# Patient Record
Sex: FEMALE | Race: WHITE | Employment: OTHER | URBAN - METROPOLITAN AREA
[De-identification: names, ages, dates, MRNs, and addresses within clinical notes are randomized per-mention and may not be internally consistent; named-entity substitution may affect disease eponyms.]

---

## 2017-01-06 ENCOUNTER — ALLIED HEALTH/NURSE VISIT (OUTPATIENT)
Dept: FAMILY MEDICINE | Facility: CLINIC | Age: 62
End: 2017-01-06
Payer: COMMERCIAL

## 2017-01-06 DIAGNOSIS — E53.8 VITAMIN B12 DEFICIENCY (NON ANEMIC): Primary | ICD-10-CM

## 2017-01-06 PROCEDURE — 96372 THER/PROPH/DIAG INJ SC/IM: CPT

## 2017-01-06 PROCEDURE — 99207 ZZC NO CHARGE NURSE ONLY: CPT

## 2017-01-06 NOTE — PROGRESS NOTES
The following medication was given:     MEDICATION: Vitamin B12  1000mcg  ROUTE: IM  SITE: Deltoid - Left  DOSE: 1mL  LOT #: [id number]  :  [employer]  EXPIRATION DATE:  05/18  NDC#: 1012-2556-80  RACHEL Richter

## 2017-02-17 ENCOUNTER — ALLIED HEALTH/NURSE VISIT (OUTPATIENT)
Dept: FAMILY MEDICINE | Facility: CLINIC | Age: 62
End: 2017-02-17
Payer: COMMERCIAL

## 2017-02-17 DIAGNOSIS — J30.1 ALLERGIC RHINITIS DUE TO POLLEN: ICD-10-CM

## 2017-02-17 DIAGNOSIS — E53.8 VITAMIN B12 DEFICIENCY (NON ANEMIC): Primary | ICD-10-CM

## 2017-02-17 DIAGNOSIS — E03.4 HYPOTHYROIDISM DUE TO ACQUIRED ATROPHY OF THYROID: ICD-10-CM

## 2017-02-17 PROCEDURE — 96372 THER/PROPH/DIAG INJ SC/IM: CPT

## 2017-02-17 PROCEDURE — 99207 ZZC NO CHARGE NURSE ONLY: CPT

## 2017-02-17 RX ORDER — LEVOTHYROXINE SODIUM 150 UG/1
150 TABLET ORAL DAILY
Qty: 90 TABLET | Refills: 0 | Status: SHIPPED | OUTPATIENT
Start: 2017-02-17 | End: 2017-05-21

## 2017-02-17 NOTE — TELEPHONE ENCOUNTER
Prescription approved per [facility] [place].    Missy Pearson  Pharm.D.  [facility]  Float Pharmacist  On Behalf of [facility] Gamaliel Jama

## 2017-02-17 NOTE — TELEPHONE ENCOUNTER
Qnasl      Last Written Prescription Date: 5/27/16  Last Fill Quantity: 8.7g,  # refills: 3   Last Office Visit with [facility], [facility] or [facility] prescribing provider: 5/27/16                                         Next 5 appointments (look out 90 days)     Feb 17, 2017  4:30 PM CST   Nurse Only with Fl Ll Cma/Lpn   [facility]    [address] [address]   [phone]            Mar 20, 2017  4:30 PM CDT   Nurse Only with Fl Ll Cma/Lpn   [facility] ([facility])    [address] [address]   [phone]                  Synthroid 150mcg     Last Written Prescription Date: 5/27/16  Last Quantity: 90, # refills: 2  Last Office Visit with [facility], [facility] or [facility] prescribing provider: 5/27/16   Next 5 appointments (look out 90 days)     Feb 17, 2017  4:30 PM CST   Nurse Only with Fl Ll Cma/Lpn   [facility] ([facility])    [address] [address] [phone]            Mar 20, 2017  4:30 PM CDT   Nurse Only with Fl Ll Cma/Lpn   [facility] ([facility])    [address] [address]   [phone]                   TSH   Date Value Ref Range Status   10/20/2015 1.6 mcU/mL Final       Timi Bridges CPhT  [facility]    On behalf of [facility]

## 2017-03-20 ENCOUNTER — ALLIED HEALTH/NURSE VISIT (OUTPATIENT)
Dept: FAMILY MEDICINE | Facility: CLINIC | Age: 62
End: 2017-03-20
Payer: COMMERCIAL

## 2017-03-20 DIAGNOSIS — E53.8 VITAMIN B12 DEFICIENCY (NON ANEMIC): Primary | ICD-10-CM

## 2017-03-20 PROCEDURE — 96372 THER/PROPH/DIAG INJ SC/IM: CPT

## 2017-03-20 PROCEDURE — 99207 ZZC NO CHARGE NURSE ONLY: CPT

## 2017-04-21 ENCOUNTER — ALLIED HEALTH/NURSE VISIT (OUTPATIENT)
Dept: FAMILY MEDICINE | Facility: CLINIC | Age: 62
End: 2017-04-21
Payer: COMMERCIAL

## 2017-04-21 DIAGNOSIS — E53.8 VITAMIN B12 DEFICIENCY (NON ANEMIC): ICD-10-CM

## 2017-04-21 PROCEDURE — 96372 THER/PROPH/DIAG INJ SC/IM: CPT

## 2017-04-21 PROCEDURE — 99207 ZZC NO CHARGE NURSE ONLY: CPT

## 2017-04-21 NOTE — PROGRESS NOTES
The following medication was given:     MEDICATION: Vitamin B12  1,000mcg  ROUTE: IM  SITE: Deltoid - Right  DOSE: mL  LOT #: [id number]  :  [employer]  EXPIRATION DATE:  7/1/2018  NDC#: 8223-1981-61

## 2017-04-21 NOTE — MR AVS SNAPSHOT
After Visit Summary   4/21/2017    Leanna Griffin    MRN: [id number]           Patient Information     Date Of Birth          1955        Visit Information        Provider Department      4/21/2017 4:30 PM Cma/Lpn, Fl Ll [facility]        Today's Diagnoses     Vitamin B12 deficiency (non anemic)           Follow-ups after your visit        Your next 10 appointments already scheduled     Apr 21, 2017  4:30 PM CDT   Nurse Only with Fl Ll Cma/Lpn   [facility] ([facility]    [address] [address] [phone]            May 19, 2017  4:30 PM CDT   Nurse Only with Fl Ll Cma/Lpn   [facility] ([facility])    [address] [address] [phone]              Who to contact     Normal or non-critical lab and imaging results will be communicated to you by Mojeekhart, letter or phone within 4 business days after the clinic has received the results. If you do not hear from us within 7 days, please contact the clinic through Mojeekhart or phone. If you have a critical or abnormal lab result, we will notify you by phone as soon as possible.  Submit refill requests through Vestorly or call your pharmacy and they will forward the refill request to us. Please allow 3 business days for your refill to be completed.          If you need to speak with a  for additional information , please call: [phone]           Additional Information About Your Visit        [employer]jeek[employer] Information     Vestorly gives you secure access to your electronic health record. If you see a primary care provider, you can also send messages to your care team and make appointments. If you have questions, please call your primary care clinic.  If you do not have a primary care provider, please call [phone] and they will assist you.        Care EveryWhere ID     This is your Care EveryWhere ID. This  could be used by other organizations to access your [facility] medical records  [id number]        Your Vitals Were     Last Period                   06/23/2006            Blood Pressure from Last 3 Encounters:   05/27/16 117/73   02/08/16 108/60   09/08/15 149/89    Weight from Last 3 Encounters:   05/27/16 225 lb 9.6 oz (102.3 kg)   09/08/15 220 lb 14.4 oz (100.2 kg)   08/18/15 220 lb 9.6 oz (100.1 kg)              We Performed the Following     THER/PROPH/DIAG INJ, SC/IM     VITAMIN B12 INJ /1000MCG        Primary Care Provider Office Phone # Fax #    Zackery Hope [phone]       [facility] [address]  [facility] [address]        Thank you!     Thank you for choosing [facility]  for your care. Our goal is always to provide you with excellent care. Hearing back from our patients is one way we can continue to improve our services. Please take a few minutes to complete the written survey that you may receive in the mail after your visit with us. Thank you!             Your Updated Medication List - Protect others around you: Learn how to safely use, store and throw away your medicines at www.disposemymeds.org.          This list is accurate as of: 4/21/17  4:24 PM.  Always use your most recent med list.                   Brand Name Dispense Instructions for use    Beclomethasone Dipropionate 80 MCG/ACT Aers Nasal Spray    QNASL    8.7 g    Spray 2 sprays into both nostrils daily       calcium 500 MG Chew      Take 500 mg by mouth daily       cholecalciferol 5000 UNITS Caps capsule    vitamin D3     Take 5,000 Units by mouth daily       cyanocobalamin 1000 MCG/ML injection    VITAMIN B12    1 mL    Inject 1 mL (1,000 mcg) into the muscle every 30 days       folic acid 1 MG tablet    FOLVITE    90 tablet    Take 1 tablet (1 mg) by mouth daily       levothyroxine 150 MCG tablet    SYNTHROID/LEVOTHROID    90 tablet    Take 1 tablet (150 mcg) by mouth daily        montelukast 10 MG tablet    SINGULAIR    180 tablet    Take 1 tablet (10 mg) by mouth 2 times daily       WOMENS MULTI VITAMIN & MINERAL PO      Take by mouth daily

## 2017-05-19 ENCOUNTER — ALLIED HEALTH/NURSE VISIT (OUTPATIENT)
Dept: FAMILY MEDICINE | Facility: CLINIC | Age: 62
End: 2017-05-19
Payer: COMMERCIAL

## 2017-05-19 DIAGNOSIS — E53.8 VITAMIN B12 DEFICIENCY (NON ANEMIC): Primary | ICD-10-CM

## 2017-05-19 PROCEDURE — 99207 ZZC NO CHARGE NURSE ONLY: CPT

## 2017-05-19 PROCEDURE — 96372 THER/PROPH/DIAG INJ SC/IM: CPT

## 2017-05-21 DIAGNOSIS — L50.0 ALLERGIC URTICARIA: ICD-10-CM

## 2017-05-21 DIAGNOSIS — E53.8 FOLATE DEFICIENCY: ICD-10-CM

## 2017-05-21 DIAGNOSIS — E03.4 HYPOTHYROIDISM DUE TO ACQUIRED ATROPHY OF THYROID: ICD-10-CM

## 2017-05-21 DIAGNOSIS — E53.8 VITAMIN B12 DEFICIENCY (NON ANEMIC): ICD-10-CM

## 2017-05-22 NOTE — TELEPHONE ENCOUNTER
Folic Acid  1mg      Last Written Prescription Date: 11/14/2016   #90 x 1  Last filled 02/17/2017  Last Office Visit with [facility], [facility] or  Health prescribing provider: 05/27/2016 ANGELINE Hope                                         Next 5 appointments (look out 90 days)     Jun 09, 2017  4:30 PM CDT   Nurse Only with Fl Ganga Crane/Lpn   [facility]    [address] [address] [phone]

## 2017-05-23 RX ORDER — FOLIC ACID 1 MG/1
1000 TABLET ORAL DAILY
Qty: 30 TABLET | Refills: 0 | Status: SHIPPED | OUTPATIENT
Start: 2017-05-23 | End: 2017-06-12

## 2017-05-23 RX ORDER — MONTELUKAST SODIUM 10 MG/1
1 TABLET ORAL 2 TIMES DAILY
Qty: 60 TABLET | Refills: 0 | Status: SHIPPED | OUTPATIENT
Start: 2017-05-23 | End: 2017-06-12

## 2017-05-23 RX ORDER — LEVOTHYROXINE SODIUM 150 UG/1
150 TABLET ORAL DAILY
Qty: 30 TABLET | Refills: 0 | Status: SHIPPED | OUTPATIENT
Start: 2017-05-23 | End: 2017-06-12

## 2017-06-12 ENCOUNTER — OFFICE VISIT (OUTPATIENT)
Dept: FAMILY MEDICINE | Facility: CLINIC | Age: 62
End: 2017-06-12
Payer: COMMERCIAL

## 2017-06-12 VITALS
SYSTOLIC BLOOD PRESSURE: 137 MMHG | HEART RATE: 80 BPM | DIASTOLIC BLOOD PRESSURE: 84 MMHG | WEIGHT: 251.6 LBS | TEMPERATURE: 97.6 F | HEIGHT: 64 IN | BODY MASS INDEX: 42.95 KG/M2

## 2017-06-12 DIAGNOSIS — M35.00 SJOGREN'S SYNDROME (H): ICD-10-CM

## 2017-06-12 DIAGNOSIS — E53.8 VITAMIN B12 DEFICIENCY (NON ANEMIC): ICD-10-CM

## 2017-06-12 DIAGNOSIS — L50.0 ALLERGIC URTICARIA: ICD-10-CM

## 2017-06-12 DIAGNOSIS — E78.5 HYPERLIPIDEMIA LDL GOAL <130: ICD-10-CM

## 2017-06-12 DIAGNOSIS — G62.9 NEUROPATHY: ICD-10-CM

## 2017-06-12 DIAGNOSIS — E03.4 HYPOTHYROIDISM DUE TO ACQUIRED ATROPHY OF THYROID: ICD-10-CM

## 2017-06-12 DIAGNOSIS — E53.8 FOLATE DEFICIENCY: ICD-10-CM

## 2017-06-12 DIAGNOSIS — J30.1 SEASONAL ALLERGIC RHINITIS DUE TO POLLEN: ICD-10-CM

## 2017-06-12 DIAGNOSIS — E66.09 NON MORBID OBESITY DUE TO EXCESS CALORIES: Primary | ICD-10-CM

## 2017-06-12 DIAGNOSIS — I10 HYPERTENSION GOAL BP (BLOOD PRESSURE) < 140/90: ICD-10-CM

## 2017-06-12 PROCEDURE — 84443 ASSAY THYROID STIM HORMONE: CPT | Performed by: FAMILY MEDICINE

## 2017-06-12 PROCEDURE — 90471 IMMUNIZATION ADMIN: CPT | Performed by: FAMILY MEDICINE

## 2017-06-12 PROCEDURE — 82746 ASSAY OF FOLIC ACID SERUM: CPT | Performed by: FAMILY MEDICINE

## 2017-06-12 PROCEDURE — 90670 PCV13 VACCINE IM: CPT | Performed by: FAMILY MEDICINE

## 2017-06-12 PROCEDURE — 80061 LIPID PANEL: CPT | Performed by: FAMILY MEDICINE

## 2017-06-12 PROCEDURE — 99000 SPECIMEN HANDLING OFFICE-LAB: CPT | Performed by: FAMILY MEDICINE

## 2017-06-12 PROCEDURE — 99214 OFFICE O/P EST MOD 30 MIN: CPT | Mod: 25 | Performed by: FAMILY MEDICINE

## 2017-06-12 PROCEDURE — 82607 VITAMIN B-12: CPT | Performed by: FAMILY MEDICINE

## 2017-06-12 PROCEDURE — 83921 ORGANIC ACID SINGLE QUANT: CPT | Mod: 90 | Performed by: FAMILY MEDICINE

## 2017-06-12 PROCEDURE — 80053 COMPREHEN METABOLIC PANEL: CPT | Performed by: FAMILY MEDICINE

## 2017-06-12 PROCEDURE — 84550 ASSAY OF BLOOD/URIC ACID: CPT | Performed by: FAMILY MEDICINE

## 2017-06-12 PROCEDURE — 36415 COLL VENOUS BLD VENIPUNCTURE: CPT | Performed by: FAMILY MEDICINE

## 2017-06-12 RX ORDER — MONTELUKAST SODIUM 10 MG/1
1 TABLET ORAL 2 TIMES DAILY
Qty: 180 TABLET | Refills: 3 | Status: SHIPPED | OUTPATIENT
Start: 2017-06-12 | End: 2017-12-18

## 2017-06-12 RX ORDER — CYANOCOBALAMIN 1000 UG/ML
1 INJECTION, SOLUTION INTRAMUSCULAR; SUBCUTANEOUS
Qty: 1 ML | Refills: 11 | Status: SHIPPED | OUTPATIENT
Start: 2017-06-12 | End: 2017-12-18

## 2017-06-12 RX ORDER — LEVOTHYROXINE SODIUM 150 UG/1
150 TABLET ORAL DAILY
Qty: 90 TABLET | Refills: 3 | Status: SHIPPED | OUTPATIENT
Start: 2017-06-12 | End: 2017-06-17

## 2017-06-12 RX ORDER — FOLIC ACID 1 MG/1
1000 TABLET ORAL DAILY
Qty: 30 TABLET | Refills: 0 | Status: SHIPPED | OUTPATIENT
Start: 2017-06-12 | End: 2017-07-25

## 2017-06-12 NOTE — NURSING NOTE
"Chief Complaint   Patient presents with     Recheck Medication       Initial /84  Pulse 80  Temp 97.6  F (36.4  C) (Tympanic)  Ht 5' 3.5\" (1.613 m)  Wt 251 lb 9.6 oz (114.1 kg)  LMP 06/23/2006  Breastfeeding? No  BMI 43.87 kg/m2 Estimated body mass index is 43.87 kg/(m^2) as calculated from the following:    Height as of this encounter: 5' 3.5\" (1.613 m).    Weight as of this encounter: 251 lb 9.6 oz (114.1 kg).  Medication Reconciliation: complete     Yvonne Lorenzo CMA      "

## 2017-06-12 NOTE — MR AVS SNAPSHOT
After Visit Summary   6/12/2017    Leanna Griffin    MRN: [id number]           Patient Information     Date Of Birth          1955        Visit Information        Provider Department      6/12/2017 6:30 PM Zackery Hope MD [facility]        Today's Diagnoses     Non morbid obesity due to excess calories    -  1    Folate deficiency        Vitamin B12 deficiency (non anemic)        Allergic urticaria        Hypothyroidism due to acquired atrophy of thyroid        Hypertension goal BP (blood pressure) < 140/90        Hyperlipidemia LDL goal <130        Sjogren's syndrome (H)        Neuropathy (H)        Seasonal allergic rhinitis due to pollen          Care Instructions    Labs today.  Exercise as tollerated.  Maybe a trike?  :-)  Refills done.           Follow-ups after your visit        Follow-up notes from your care team     Return in about 6 months (around 12/12/2017).      Your next 10 appointments already scheduled     Jun 16, 2017  4:30 PM CDT   Nurse Only with Fl Ll Royce/Lpn   [facility] ([facility])    [address] [address] [phone]              Who to contact     Normal or non-critical lab and imaging results will be communicated to you by PopJaxhart, letter or phone within 4 business days after the clinic has received the results. If you do not hear from us within 7 days, please contact the clinic through PopJaxhart or phone. If you have a critical or abnormal lab result, we will notify you by phone as soon as possible.  Submit refill requests through [employer] or call your pharmacy and they will forward the refill request to us. Please allow 3 business days for your refill to be completed.          If you need to speak with a  for additional information , please call: [phone]           Additional Information About Your Visit        [employer]ax[employer] Information     [employer] gives you secure access to your  "electronic health record. If you see a primary care provider, you can also send messages to your care team and make appointments. If you have questions, please call your primary care clinic.  If you do not have a primary care provider, please call [phone] and they will assist you.        Care EveryWhere ID     This is your Care EveryWhere ID. This could be used by other organizations to access your [facility] medical records  [id number]        Your Vitals Were     Pulse Temperature Height Last Period Breastfeeding? BMI (Body Mass Index)    80 97.6  F (36.4  C) (Tympanic) 5' 3.5\" (1.613 m) 06/23/2006 No 43.87 kg/m2       Blood Pressure from Last 3 Encounters:   06/12/17 137/84   05/27/16 117/73   02/08/16 108/60    Weight from Last 3 Encounters:   06/12/17 251 lb 9.6 oz (114.1 kg)   05/27/16 225 lb 9.6 oz (102.3 kg)   09/08/15 220 lb 14.4 oz (100.2 kg)              We Performed the Following     ADMIN: Vaccine, Initial (13773)     Comprehensive metabolic panel     Folate     Lipid panel reflex to direct LDL     Methylmalonic acid     Pneumococcal vaccine 13 valent PCV13 IM (Prevnar) [05691]     TSH     Uric acid     Vitamin B12          Where to get your medicines      These medications were sent to [facility] [address]  [address] [address]     Phone:  [phone]     Beclomethasone Dipropionate 80 MCG/ACT Aers Nasal Spray    cyanocobalamin 1000 MCG/ML injection    folic acid 1 MG tablet    levothyroxine 150 MCG tablet    montelukast 10 MG tablet          Primary Care Provider Office Phone # Fax #    Zackery Hope [phone]       [facility] [address]  [facility] [address]        Thank you!     Thank you for choosing [facility]  for your care. Our goal is always to provide you with excellent care. Hearing back from our patients is one way we can continue to improve our services. Please " take a few minutes to complete the written survey that you may receive in the mail after your visit with us. Thank you!             Your Updated Medication List - Protect others around you: Learn how to safely use, store and throw away your medicines at www.disposemymeds.org.          This list is accurate as of: 6/12/17  7:08 PM.  Always use your most recent med list.                   Brand Name Dispense Instructions for use    Beclomethasone Dipropionate 80 MCG/ACT Aers Nasal Spray    QNASL    8.7 g    Spray 2 sprays into both nostrils daily       calcium 500 MG Chew      Take 500 mg by mouth daily       cholecalciferol 5000 UNITS Caps capsule    vitamin D3     Take 5,000 Units by mouth daily       cyanocobalamin 1000 MCG/ML injection    VITAMIN B12    1 mL    Inject 1 mL (1,000 mcg) into the muscle every 30 days       folic acid 1 MG tablet    FOLVITE    30 tablet    Take 1 tablet (1,000 mcg) by mouth daily (Needs follow-up appointment for this medication)       levothyroxine 150 MCG tablet    SYNTHROID    90 tablet    Take 1 tablet (150 mcg) by mouth daily (Needs follow-up appointment for this medication)       montelukast 10 MG tablet    SINGULAIR    180 tablet    Take 1 tablet (10 mg) by mouth 2 times daily (Needs follow-up appointment for this medication)       WOMENS MULTI VITAMIN & MINERAL PO      Take by mouth daily

## 2017-06-12 NOTE — PROGRESS NOTES
SUBJECTIVE:                                                    Leanna Griffin is a 61 year old female who presents to clinic today for the following health issues:      Medication Followup of Nasal spray, vitamin b 12 injections, Levothyroxine, folic acid and singulair.     Taking Medication as prescribed: yes    Side Effects:  None    Medication Helping Symptoms:  yes       PROBLEMS TO ADD ON...    Patient Active Problem List   Diagnosis     Hypothyroidism - doing well. No issues.   Lab Results   Component Value Date    TSH 1.6 10/20/2015         Obesity - likes to swim. Not really watching diet. No bike riding.       Gouty arthropathy - no specific issues     Hypertension goal BP (blood pressure) < 140/90 -  Lab Results   Component Value Date    CR 0.74 05/27/2016         Family history of malignant neoplasm of breast - due for mammogram in fall.      Left knee Medial compartment DJD - stable. Along with neuropathy, limites activity.     Hyperlipidemia LDL goal <130 - concerned about statin impact on neuropathy and really doesn't want to consider restarting.   Lab Results   Component Value Date     06/02/2015         Sjogren's syndrome (H) - dry but manageable.  Joints hurt a lot. 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 Impacting balance. Is careful.      Vitamin B12 deficiency (non anemic) - did not tolerate oral.      Folate deficiency     AR (allergic rhinitis) - does ok as long as using medications including high dose Singulair as recommended by allergy.       Problem list and histories reviewed & adjusted, as indicated.  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 Related to injury     HC REMOVAL OF TONSILS,<11 Y/O         Social History   Substance Use Topics     Smoking status: Never Smoker     Smokeless tobacco: Never Used     Alcohol use 0.0 oz/week     0 Standard drinks or equivalent per week      Comment: one drink/month     Family History   Problem Relation Age of Onset     C.A.D. Father      DIABETES Paternal Aunt      CANCER Other      ovarian in mother, prostate in father; melanoma in father     Lipids Other      Thyroid Disease Other      mother, father     Breast Cancer Mother            Reviewed and updated as needed this visit by clinical staff  Tobacco  Allergies  Meds  Med Hx  Surg Hx  Fam Hx  Soc Hx      Reviewed and updated as needed this visit by Provider         1. Non morbid obesity due to excess calories    2. Folate deficiency    3. Vitamin B12 deficiency (non anemic)    4. Allergic urticaria    5. Hypothyroidism due to acquired atrophy of thyroid    6. Hypertension goal BP (blood pressure) < 140/90    7. Hyperlipidemia LDL goal <130    8. Sjogren's syndrome (H)    9. Neuropathy (H)    10. Seasonal allergic rhinitis due to pollen        PMH: Updated and/or reviewed in chart.    PSH: Updated and/or reviewed in chart.    Family History: Updated and/or reviewed in chart.     ROS:  Constitutional, neuro, EMT, endocrine, pulmonary, cardiac, gastrointestinal, genitourinary, musculoskeletal, integument and psychiatric systems are otherwise negative.    OBJECTIVE:                                            "         /84  Pulse 80  Temp 97.6  F (36.4  C) (Tympanic)  Ht 5' 3.5\" (1.613 m)  Wt 251 lb 9.6 oz (114.1 kg)  LMP 06/23/2006  Breastfeeding? No  BMI 43.87 kg/m2  GENERAL: Obese. Pleasant and interactive.  Alert and oriented x 3.  No acute distress.  HEENT: Normocephalic, atraumatic. PEERRLA, EOMI.  Scleras, lids and conjunctivae normal. Pinnas, canals and TM's clear.  Nose and oropharynx moist and clear.  NECK: supple and free of adenopathy or masses, the thyroid is normal without enlargement or nodules.  HEART:  S1 and S2 normal, no murmurs, clicks, gallops or rubs. Regular rate and rhythm.  CHEST:  clear, no wheezing or rales. Normal symmetric air entry throughout both lung fields. No chest wall deformities or tenderness.     LABS: Ordered and pending at this time.       Results for orders placed or performed during the hospital encounter of 08/22/16   MA Screening Digital Bilateral    Narrative    MA SCREENING DIGITAL BILATERAL 8/22/2016 5:11 PM    HISTORY:  Screening.  No new breast complaints.    COMPARISON:  06/15/2011, 01/20/2014, 11/13/2012, 11/11/11    TECHNIQUE:  Digital mammography with CAD is performed.    BREAST DENSITY: Scattered fibroglandular densities.    COMMENTS: No findings of suspicion for malignancy.       Impression    IMPRESSION: BI-RADS CATEGORY: 1 -  NEGATIVE.    RECOMMENDED FOLLOW-UP: Annual Mammography.    VINCENT GUSTAFSON MD      ASSESSMENT/PLAN:                                                        ICD-10-CM    1. Non morbid obesity due to excess calories E66.09    2. Folate deficiency E53.8 folic acid (FOLVITE) 1 MG tablet     Methylmalonic acid     Vitamin B12     Folate     CANCELED: Homocysteine   3. Vitamin B12 deficiency (non anemic) E53.8 folic acid (FOLVITE) 1 MG tablet     cyanocobalamin (VITAMIN B12) 1000 MCG/ML injection     Methylmalonic acid     Vitamin B12     Folate     CANCELED: Homocysteine   4. Allergic urticaria L50.0 montelukast (SINGULAIR) 10 MG tablet " "  5. Hypothyroidism due to acquired atrophy of thyroid E03.4 levothyroxine (SYNTHROID) 150 MCG tablet     TSH     Comprehensive metabolic panel   6. Hypertension goal BP (blood pressure) < 140/90 I10    7. Hyperlipidemia LDL goal <130 E78.5 Lipid panel reflex to direct LDL   8. Sjogren's syndrome (H) M35.00 Uric acid     Comprehensive metabolic panel     Pneumococcal vaccine 13 valent PCV13 IM (Prevnar) [90022]     ADMIN: Vaccine, Initial (90471)   9. Neuropathy (H) G62.9    10. Seasonal allergic rhinitis due to pollen J30.1 Beclomethasone Dipropionate (QNASL) 80 MCG/ACT AERS Nasal [place]       Care plan updated in chart for chronic problems.    Patient Instructions   Labs today.  Exercise as tollerated.  Maybe a trike?  :-)  Refills done.      Orders Placed This Encounter     Pneumococcal vaccine 13 valent PCV13 IM (Prevnar) [63186]     ADMIN: Vaccine, Initial (90471)     Methylmalonic acid     Homocysteine     Vitamin B12     Folate     TSH     Uric acid     Comprehensive metabolic panel     Lipid panel reflex to direct LDL     folic acid (FOLVITE) 1 MG tablet     montelukast (SINGULAIR) 10 MG tablet     levothyroxine (SYNTHROID) 150 MCG tablet     cyanocobalamin (VITAMIN B12) 1000 MCG/ML injection     Beclomethasone Dipropionate (QNASL) 80 MCG/ACT AERS Nasal Spray      Lab Results   Component Value Date    CHOL 263 06/02/2015     Lab Results   Component Value Date    HDL 48 06/02/2015     Lab Results   Component Value Date     06/02/2015     Lab Results   Component Value Date    TRIG 155 06/02/2015     Lab Results   Component Value Date    CHOLHDLRATIO 5.5 06/02/2015        BMI:   Estimated body mass index is 43.87 kg/(m^2) as calculated from the following:    Height as of this encounter: 5' 3.5\" (1.613 m).    Weight as of this encounter: 251 lb 9.6 oz (114.1 kg).   Weight management plan: Discussed healthy diet and exercise guidelines and patient will follow up in 6 months in clinic to " re-evaluate.      See Patient Instructions    Zackery Hope MD

## 2017-06-13 LAB
ALBUMIN SERPL-MCNC: 4 G/DL (ref 3.4–5)
ALP SERPL-CCNC: 89 U/L (ref 40–150)
ALT SERPL W P-5'-P-CCNC: 28 U/L (ref 0–50)
ANION GAP SERPL CALCULATED.3IONS-SCNC: 8 MMOL/L (ref 3–14)
AST SERPL W P-5'-P-CCNC: 16 U/L (ref 0–45)
BILIRUB SERPL-MCNC: 0.4 MG/DL (ref 0.2–1.3)
BUN SERPL-MCNC: 13 MG/DL (ref 7–30)
CALCIUM SERPL-MCNC: 8.7 MG/DL (ref 8.5–10.1)
CHLORIDE SERPL-SCNC: 108 MMOL/L (ref 94–109)
CHOLEST SERPL-MCNC: 239 MG/DL
CO2 SERPL-SCNC: 23 MMOL/L (ref 20–32)
CREAT SERPL-MCNC: 0.7 MG/DL (ref 0.52–1.04)
FOLATE SERPL-MCNC: 47.3 NG/ML
GFR SERPL CREATININE-BSD FRML MDRD: 85 ML/MIN/1.7M2
GLUCOSE SERPL-MCNC: 119 MG/DL (ref 70–99)
HDLC SERPL-MCNC: 38 MG/DL
LDLC SERPL CALC-MCNC: 147 MG/DL
NONHDLC SERPL-MCNC: 201 MG/DL
POTASSIUM SERPL-SCNC: 3.6 MMOL/L (ref 3.4–5.3)
PROT SERPL-MCNC: 7.7 G/DL (ref 6.8–8.8)
SODIUM SERPL-SCNC: 139 MMOL/L (ref 133–144)
TRIGL SERPL-MCNC: 269 MG/DL
TSH SERPL DL<=0.05 MIU/L-ACNC: 14.77 MU/L (ref 0.4–4)
URATE SERPL-MCNC: 3.9 MG/DL (ref 2.6–6)
VIT B12 SERPL-MCNC: 233 PG/ML (ref 193–986)

## 2017-06-13 NOTE — PROGRESS NOTES
Screening Questionnaire for Adult Immunization    Are you sick today?   No   Do you have allergies to medications, food, a vaccine component or latex?   No   Have you ever had a serious reaction after receiving a vaccination?   No   Do you have a long-term health problem with heart disease, lung disease, asthma, kidney disease, metabolic disease (e.g. diabetes), anemia, or other blood disorder?   Yes   Do you have cancer, leukemia, HIV/AIDS, or any other immune system problem?   No   In the past 3 months, have you taken medications that affect  your immune system, such as prednisone, other steroids, or anticancer drugs; drugs for the treatment of rheumatoid arthritis, Crohn s disease, or psoriasis; or have you had radiation treatments?   No   Have you had a seizure, or a brain or other nervous system problem?   No   During the past year, have you received a transfusion of blood or blood     products, or been given immune (gamma) globulin or antiviral drug?   No   For women: Are you pregnant or is there a chance you could become        pregnant during the next month?   No   Have you received any vaccinations in the past 4 weeks?   No     Immunization questionnaire was positive for at least one answer.  Notified .      MNVFC doesn't apply on this patient    Per orders of Dr. Hope, injection of Prevnar 13 given by Yvonne Lorenzo. Patient instructed to remain in clinic for 20 minutes afterwards, and to report any adverse reaction to me immediately.       Screening performed by Yvonne Lorenzo on 6/12/2017 at 7:11 PM.

## 2017-06-15 LAB — METHYLMALONATE SERPL-SCNC: 0.18

## 2017-06-16 ENCOUNTER — ALLIED HEALTH/NURSE VISIT (OUTPATIENT)
Dept: FAMILY MEDICINE | Facility: CLINIC | Age: 62
End: 2017-06-16
Payer: COMMERCIAL

## 2017-06-16 DIAGNOSIS — E53.8 VITAMIN B12 DEFICIENCY (NON ANEMIC): ICD-10-CM

## 2017-06-16 PROCEDURE — 99207 ZZC NO CHARGE NURSE ONLY: CPT

## 2017-06-16 PROCEDURE — 96372 THER/PROPH/DIAG INJ SC/IM: CPT

## 2017-06-16 NOTE — NURSING NOTE
The following medication was given:     MEDICATION: Vitamin B12  1000 mcg  ROUTE: IM  SITE: Deltoid - Right  DOSE: 1000 mcg  LOT #: [id number]  :  WE[employer]OST   EXPIRATION DATE:  12/01/2018  NDC: 1282-3843-38    Paty Amor MA

## 2017-06-16 NOTE — MR AVS SNAPSHOT
After Visit Summary   6/16/2017    Leanna Griffin    MRN: [id number]           Patient Information     Date Of Birth          1955        Visit Information        Provider Department      6/16/2017 4:30 PM Cma/Lpn, Fl Ll [facility]        Today's Diagnoses     Vitamin B12 deficiency (non anemic)           Follow-ups after your visit        Your next 10 appointments already scheduled     Jul 14, 2017  4:15 PM CDT   Nurse Only with Fl Ll Cma/Lpn   [facility] ([facility])    [address] [place] [id number] [phone]              Who to contact     Normal or non-critical lab and imaging results will be communicated to you by Lessonwriterhart, letter or phone within 4 business days after the clinic has received the results. If you do not hear from us within 7 days, please contact the clinic through Lessonwriterhart or phone. If you have a critical or abnormal lab result, we will notify you by phone as soon as possible.  Submit refill requests through [employer] or call your pharmacy and they will forward the refill request to us. Please allow 3 business days for your refill to be completed.          If you need to speak with a  for additional information , please call: [phone]           Additional Information About Your Visit        Lessonwriterhart Information     [employer] gives you secure access to your electronic health record. If you see a primary care provider, you can also send messages to your care team and make appointments. If you have questions, please call your primary care clinic.  If you do not have a primary care provider, please call [phone] and they will assist you.        Care EveryWhere ID     This is your Care EveryWhere ID. This could be used by other organizations to access your [place] medical records  [id number]        Your Vitals Were     Last Period                   06/23/2006            Blood Pressure from Last  3 Encounters:   06/12/17 137/84   05/27/16 117/73   02/08/16 108/60    Weight from Last 3 Encounters:   06/12/17 251 lb 9.6 oz (114.1 kg)   05/27/16 225 lb 9.6 oz (102.3 kg)   09/08/15 220 lb 14.4 oz (100.2 kg)              We Performed the Following     THER/PROPH/DIAG INJ, SC/IM     VITAMIN B12 INJ /1000MCG        Primary Care Provider Office Phone # Fax #    Zackery Hope [phone]       [facility] [address]  [facility] [address]        Thank you!     Thank you for choosing [facility]  for your care. Our goal is always to provide you with excellent care. Hearing back from our patients is one way we can continue to improve our services. Please take a few minutes to complete the written survey that you may receive in the mail after your visit with us. Thank you!             Your Updated Medication List - Protect others around you: Learn how to safely use, store and throw away your medicines at www.disposemymeds.org.          This list is accurate as of: 6/16/17  4:44 PM.  Always use your most recent med list.                   Brand Name Dispense Instructions for use    Beclomethasone Dipropionate 80 MCG/ACT Aers Nasal Spray    QNASL    8.7 g    Spray 2 sprays into both nostrils daily       calcium 500 MG Chew      Take 500 mg by mouth daily       cholecalciferol 5000 UNITS Caps capsule    vitamin D3     Take 5,000 Units by mouth daily       cyanocobalamin 1000 MCG/ML injection    VITAMIN B12    1 mL    Inject 1 mL (1,000 mcg) into the muscle every 30 days       folic acid 1 MG tablet    FOLVITE    30 tablet    Take 1 tablet (1,000 mcg) by mouth daily (Needs follow-up appointment for this medication)       levothyroxine 150 MCG tablet    SYNTHROID    90 tablet    Take 1 tablet (150 mcg) by mouth daily (Needs follow-up appointment for this medication)       montelukast 10 MG tablet    SINGULAIR    180 tablet    Take 1 tablet (10 mg) by mouth 2 times  daily (Needs follow-up appointment for this medication)       WOMENS MULTI VITAMIN & MINERAL PO      Take by mouth daily

## 2017-06-17 RX ORDER — LEVOTHYROXINE SODIUM 175 UG/1
175 TABLET ORAL DAILY
Qty: 90 TABLET | Refills: 1 | Status: SHIPPED | OUTPATIENT
Start: 2017-06-17 | End: 2017-12-20

## 2017-06-17 NOTE — PROGRESS NOTES
Ms. Griffin,    Your uric acid level was normal.  This does not exclude the possibility of gout but it makes it less common to occur.  Additionally, if you have or develop gout, lowering this level with medications problably won't help.     Your chemistry, liver and kidney function tests were normal.  Your blood sugar was a little elevated which is not unexpected given your diabetes    Your B12 levels are normal.  It doesn't look like there is a significant deficiency at this point.    Your LDL (bad cholesterol)  was above goal.  Genetics, diet, weight and low exercise levels can contribute to this. Your HDL (good cholesterol) was below my goal for you (>45 in men and > 55 in women).  Genetics and inactivity can contribute to this. Your triglycerides were above normal.  Poor diet, genetics and being overweight can contribute to this.  1000mg daily of omega-3 fatty acids may improve this. Elevated LDL cholesterol and triglycerides as well as low HDL cholesterol all increase a person's risk for heart and vascular disease. You need to recheck fasting labs yearly. Maintaining a healthy diet with lean proteins, whole grains and healthy fats such as olive oil as well as regular exercise and maintaining an appropriate weight all contribute to healthier cholesterol levels.    Your TSH is high suggesting lower than normal thyroid activity.  I suggest we change your medication dose to 175 micrograms/day and recheck your TSH in 6-8 weeks.  Thank you.     Please contact the clinic if you have additional questions.  Thank you.    Sincerely,    Daljit Hope MD

## 2017-07-14 ENCOUNTER — ALLIED HEALTH/NURSE VISIT (OUTPATIENT)
Dept: FAMILY MEDICINE | Facility: CLINIC | Age: 62
End: 2017-07-14
Payer: COMMERCIAL

## 2017-07-14 DIAGNOSIS — E53.8 VITAMIN B12 DEFICIENCY (NON ANEMIC): Primary | ICD-10-CM

## 2017-07-14 PROCEDURE — 96372 THER/PROPH/DIAG INJ SC/IM: CPT

## 2017-07-14 PROCEDURE — 99207 ZZC NO CHARGE NURSE ONLY: CPT

## 2017-07-14 NOTE — PROGRESS NOTES
The following medication was given:     MEDICATION: Vitamin B12  1000mcg  ROUTE: IM  SITE: Deltoid - Left  DOSE: 1ml  LOT #: [id number]  :  [employer]  EXPIRATION DATE:  12/2018  NDC#: 5744-5402-14  Estee Norris

## 2017-07-14 NOTE — MR AVS SNAPSHOT
After Visit Summary   7/14/2017    Leanna Griffin    MRN: [id number]           Patient Information     Date Of Birth          1955        Visit Information        Provider Department      7/14/2017 4:15 PM Cma/Lpn, Fl Ll [facility]        Today's Diagnoses     Vitamin B12 deficiency (non anemic)    -  1       Follow-ups after your visit        Your next 10 appointments already scheduled     Aug 11, 2017  4:30 PM CDT   Nurse Only with Fl Ll Cma/Lpn   [facility]    [address] [address]   [phone]              Who to contact     Normal or non-critical lab and imaging results will be communicated to you by Mobile Backstagehart, letter or phone within 4 business days after the clinic has received the results. If you do not hear from us within 7 days, please contact the clinic through Mobile Backstagehart or phone. If you have a critical or abnormal lab result, we will notify you by phone as soon as possible.  Submit refill requests through [employer] or call your pharmacy and they will forward the refill request to us. Please allow 3 business days for your refill to be completed.          If you need to speak with a  for additional information , please call: [phone]           Additional Information About Your Visit        Mobile BackstageharLittleLives Information     [employer] gives you secure access to your electronic health record. If you see a primary care provider, you can also send messages to your care team and make appointments. If you have questions, please call your primary care clinic.  If you do not have a primary care provider, please call [phone] and they will assist you.        Care EveryWhere ID     This is your Care EveryWhere ID. This could be used by other organizations to access your [place] medical records  [id number]        Your Vitals Were     Last Period                   06/23/2006            Blood Pressure from  Last 3 Encounters:   06/12/17 137/84   05/27/16 117/73   02/08/16 108/60    Weight from Last 3 Encounters:   06/12/17 251 lb 9.6 oz (114.1 kg)   05/27/16 225 lb 9.6 oz (102.3 kg)   09/08/15 220 lb 14.4 oz (100.2 kg)              We Performed the Following     B12 - 1000 MCG     THER/PROPH/DIAG INJ, SC/IM        Primary Care Provider Office Phone # Fax #    Zackery Hope [phone]       [facility] [address]  [place] [address]        Equal Access to Services     DIANE [facility]ACK[facility] : Hadii jessie reddyo Soharish, waaxda luqadaha, qaybta kaalmada adehannayake, brook boyle. So [facility] [phone].    ATENCIÓN: Si habla español, tiene a nobles disposición servicios gratuitos de asistencia lingüística. Llame al [phone].    We comply with applicable federal civil rights laws and [place] laws. We do not discriminate on the basis of race, color, national origin, age, disability sex, sexual orientation or gender identity.            Thank you!     Thank you for choosing [facility]  for your care. Our goal is always to provide you with excellent care. Hearing back from our patients is one way we can continue to improve our services. Please take a few minutes to complete the written survey that you may receive in the mail after your visit with us. Thank you!             Your Updated Medication List - Protect others around you: Learn how to safely use, store and throw away your medicines at www.disposemymeds.org.          This list is accurate as of: 7/14/17  4:25 PM.  Always use your most recent med list.                   Brand Name Dispense Instructions for use Diagnosis    Beclomethasone Dipropionate 80 MCG/ACT Aers Nasal Spray    QNASL    8.7 g    Spray 2 sprays into both nostrils daily    Seasonal allergic rhinitis due to pollen, Folate deficiency, Vitamin B12 deficiency (non anemic), Allergic urticaria, Hypothyroidism due to acquired  atrophy of thyroid, Non morbid obesity due to excess calories, Hypertension goal BP (blood pressure) < 140/90, Hyperlipidemia LDL goal <130, Sjogren's syndrome (H), Neuropathy (H)       calcium 500 MG Chew      Take 500 mg by mouth daily        cholecalciferol 5000 UNITS Caps capsule    vitamin D3     Take 5,000 Units by mouth daily        cyanocobalamin 1000 MCG/ML injection    VITAMIN B12    1 mL    Inject 1 mL (1,000 mcg) into the muscle every 30 days    Vitamin B12 deficiency (non anemic), Folate deficiency, Allergic urticaria, Hypothyroidism due to acquired atrophy of thyroid, Non morbid obesity due to excess calories, Hypertension goal BP (blood pressure) < 140/90, Hyperlipidemia LDL goal <130, Sjogren's syndrome (H), Neuropathy (H), Seasonal allergic rhinitis due to pollen       folic acid 1 MG tablet    FOLVITE    30 tablet    Take 1 tablet (1,000 mcg) by mouth daily (Needs follow-up appointment for this medication)    Folate deficiency, Vitamin B12 deficiency (non anemic), Allergic urticaria, Hypothyroidism due to acquired atrophy of thyroid, Non morbid obesity due to excess calories, Hypertension goal BP (blood pressure) < 140/90, Hyperlipidemia LDL goal <130, Sjogren's syndrome (H), Neuropathy (H), Seasonal allergic rhinitis due to pollen       levothyroxine 175 MCG tablet    SYNTHROID/LEVOTHROID    90 tablet    Take 1 tablet (175 mcg) by mouth daily    Hypothyroidism due to acquired atrophy of thyroid, Folate deficiency, Vitamin B12 deficiency (non anemic), Allergic urticaria, Non morbid obesity due to excess calories, Hypertension goal BP (blood pressure) < 140/90, Hyperlipidemia LDL goal <130, Sjogren's syndrome (H), Neuropathy (H), Seasonal allergic rhinitis due to pollen       montelukast 10 MG tablet    SINGULAIR    180 tablet    Take 1 tablet (10 mg) by mouth 2 times daily (Needs follow-up appointment for this medication)    Allergic urticaria, Folate deficiency, Vitamin B12 deficiency (non  anemic), Hypothyroidism due to acquired atrophy of thyroid, Non morbid obesity due to excess calories, Hypertension goal BP (blood pressure) < 140/90, Hyperlipidemia LDL goal <130, Sjogren's syndrome (H), Neuropathy (H), Seasonal allergic rhinitis due to pollen       WOMENS MULTI VITAMIN & MINERAL PO      Take by mouth daily

## 2017-08-11 ENCOUNTER — ALLIED HEALTH/NURSE VISIT (OUTPATIENT)
Dept: FAMILY MEDICINE | Facility: CLINIC | Age: 62
End: 2017-08-11
Payer: COMMERCIAL

## 2017-08-11 DIAGNOSIS — E53.8 VITAMIN B12 DEFICIENCY (NON ANEMIC): Primary | ICD-10-CM

## 2017-08-11 PROCEDURE — 99207 ZZC NO CHARGE NURSE ONLY: CPT

## 2017-08-11 PROCEDURE — 96372 THER/PROPH/DIAG INJ SC/IM: CPT

## 2017-08-11 NOTE — MR AVS SNAPSHOT
After Visit Summary   8/11/2017    Leanna Griffin    MRN: [id number]           Patient Information     Date Of Birth          1955        Visit Information        Provider Department      8/11/2017 4:30 PM Cma/Lpn, Fl Ll [facility]        Today's Diagnoses     Vitamin B12 deficiency (non anemic)    -  1       Follow-ups after your visit        Your next 10 appointments already scheduled     Sep 08, 2017  4:30 PM CDT   Nurse Only with Fl Ll Cma/Lpn   [facility] ([facility])    [address] [address]   [phone]              Who to contact     Normal or non-critical lab and imaging results will be communicated to you by Nexesshart, letter or phone within 4 business days after the clinic has received the results. If you do not hear from us within 7 days, please contact the clinic through Nexesshart or phone. If you have a critical or abnormal lab result, we will notify you by phone as soon as possible.  Submit refill requests through [employer] or call your pharmacy and they will forward the refill request to us. Please allow 3 business days for your refill to be completed.          If you need to speak with a  for additional information , please call: [phone]           Additional Information About Your Visit        [employer] Information     [employer] gives you secure access to your electronic health record. If you see a primary care provider, you can also send messages to your care team and make appointments. If you have questions, please call your primary care clinic.  If you do not have a primary care provider, please call [phone] and they will assist you.        Care EveryWhere ID     This is your Care EveryWhere ID. This could be used by other organizations to access your [place] medical records  [id number]        Your Vitals Were     Last Period                   06/23/2006            Blood Pressure from  Last 3 Encounters:   06/12/17 137/84   05/27/16 117/73   02/08/16 108/60    Weight from Last 3 Encounters:   06/12/17 251 lb 9.6 oz (114.1 kg)   05/27/16 225 lb 9.6 oz (102.3 kg)   09/08/15 220 lb 14.4 oz (100.2 kg)              We Performed the Following     B12 - 1000 MCG     INJECTION INTRAMUSCULAR OR SUB-Q        Primary Care Provider Office Phone # Fax #    Zackery Hope [phone]       [address] [place] [address]        Equal Access to Services     [facility]: Hadii aad ku hadasho Soharish, waaxda luqadaha, qaybta kaalmada adehannayada, brook morris . So [facility] [phone].    ATENCIÓN: Si habla español, tiene a nobles disposición servicios gratuitos de asistencia lingüística. [facility] [phone].    We comply with applicable federal civil rights laws and [place] laws. We do not discriminate on the basis of race, color, national origin, age, disability sex, sexual orientation or gender identity.            Thank you!     Thank you for choosing [facility]  for your care. Our goal is always to provide you with excellent care. Hearing back from our patients is one way we can continue to improve our services. Please take a few minutes to complete the written survey that you may receive in the mail after your visit with us. Thank you!             Your Updated Medication List - Protect others around you: Learn how to safely use, store and throw away your medicines at www.disposemymeds.org.          This list is accurate as of: 8/11/17  4:30 PM.  Always use your most recent med list.                   Brand Name Dispense Instructions for use Diagnosis    Beclomethasone Dipropionate 80 MCG/ACT Aers Nasal Spray    QNASL    8.7 g    Spray 2 sprays into both nostrils daily    Seasonal allergic rhinitis due to pollen, Folate deficiency, Vitamin B12 deficiency (non anemic), Allergic urticaria, Hypothyroidism due to acquired atrophy of  thyroid, Non morbid obesity due to excess calories, Hypertension goal BP (blood pressure) < 140/90, Hyperlipidemia LDL goal <130, Sjogren's syndrome (H), Neuropathy (H)       calcium 500 MG Chew      Take 500 mg by mouth daily        cholecalciferol 5000 UNITS Caps capsule    vitamin D3     Take 5,000 Units by mouth daily        cyanocobalamin 1000 MCG/ML injection    VITAMIN B12    1 mL    Inject 1 mL (1,000 mcg) into the muscle every 30 days    Vitamin B12 deficiency (non anemic), Folate deficiency, Allergic urticaria, Hypothyroidism due to acquired atrophy of thyroid, Non morbid obesity due to excess calories, Hypertension goal BP (blood pressure) < 140/90, Hyperlipidemia LDL goal <130, Sjogren's syndrome (H), Neuropathy (H), Seasonal allergic rhinitis due to pollen       folic acid 1 MG tablet    FOLVITE    90 tablet    Take 1 tablet (1 mg) by mouth daily    Folate deficiency, Vitamin B12 deficiency (non anemic), Allergic urticaria, Hypothyroidism due to acquired atrophy of thyroid, Non morbid obesity due to excess calories, Hypertension goal BP (blood pressure) < 140/90, Hyperlipidemia LDL goal <130, Sjogren's syndrome (H), Neuropathy (H), Seasonal allergic rhinitis due to pollen       levothyroxine 175 MCG tablet    SYNTHROID/LEVOTHROID    90 tablet    Take 1 tablet (175 mcg) by mouth daily    Hypothyroidism due to acquired atrophy of thyroid, Folate deficiency, Vitamin B12 deficiency (non anemic), Allergic urticaria, Non morbid obesity due to excess calories, Hypertension goal BP (blood pressure) < 140/90, Hyperlipidemia LDL goal <130, Sjogren's syndrome (H), Neuropathy (H), Seasonal allergic rhinitis due to pollen       montelukast 10 MG tablet    SINGULAIR    180 tablet    Take 1 tablet (10 mg) by mouth 2 times daily (Needs follow-up appointment for this medication)    Allergic urticaria, Folate deficiency, Vitamin B12 deficiency (non anemic), Hypothyroidism due to acquired atrophy of thyroid, Non morbid  obesity due to excess calories, Hypertension goal BP (blood pressure) < 140/90, Hyperlipidemia LDL goal <130, Sjogren's syndrome (H), Neuropathy (H), Seasonal allergic rhinitis due to pollen       WOMENS MULTI VITAMIN & MINERAL PO      Take by mouth daily

## 2017-08-11 NOTE — PROGRESS NOTES
The following medication was given:     MEDICATION: Vitamin B12  1000mcg  ROUTE: IM  SITE: Deltoid - Right  DOSE: 1,000 (1mL)  LOT #: [id number]  :  [employer]  EXPIRATION DATE:  12/2018  NDC#: 2864-5886-13   RACEHL Richter

## 2017-09-08 ENCOUNTER — ALLIED HEALTH/NURSE VISIT (OUTPATIENT)
Dept: FAMILY MEDICINE | Facility: CLINIC | Age: 62
End: 2017-09-08
Payer: COMMERCIAL

## 2017-09-08 DIAGNOSIS — E53.8 VITAMIN B12 DEFICIENCY (NON ANEMIC): Primary | ICD-10-CM

## 2017-09-08 PROCEDURE — 99207 ZZC NO CHARGE NURSE ONLY: CPT

## 2017-09-08 PROCEDURE — 96372 THER/PROPH/DIAG INJ SC/IM: CPT

## 2017-09-08 NOTE — MR AVS SNAPSHOT
After Visit Summary   9/8/2017    Leanna Griffin    MRN: [id number]           Patient Information     Date Of Birth          1955        Visit Information        Provider Department      9/8/2017 4:30 PM Cma/Lpn, Fl Ll [facility]        Today's Diagnoses     Vitamin B12 deficiency (non anemic)    -  1       Follow-ups after your visit        Your next 10 appointments already scheduled     Oct 06, 2017  4:30 PM CDT   Nurse Only with Fl Ll Cma/Lpn   [facility]    [address] [address]   [phone]              Who to contact     Normal or non-critical lab and imaging results will be communicated to you by Petcubehart, letter or phone within 4 business days after the clinic has received the results. If you do not hear from us within 7 days, please contact the clinic through Petcubehart or phone. If you have a critical or abnormal lab result, we will notify you by phone as soon as possible.  Submit refill requests through Lexplique - /l?k â€¢ splik/ or call your pharmacy and they will forward the refill request to us. Please allow 3 business days for your refill to be completed.          If you need to speak with a  for additional information , please call: [phone]           Additional Information About Your Visit        [employer] Information     Lexplique - /l?k â€¢ splik/ gives you secure access to your electronic health record. If you see a primary care provider, you can also send messages to your care team and make appointments. If you have questions, please call your primary care clinic.  If you do not have a primary care provider, please call [phone] and they will assist you.        Care EveryWhere ID     This is your Care EveryWhere ID. This could be used by other organizations to access your [place] medical records  [id number]        Your Vitals Were     Last Period                   06/23/2006            Blood Pressure from  Last 3 Encounters:   06/12/17 137/84   05/27/16 117/73   02/08/16 108/60    Weight from Last 3 Encounters:   06/12/17 251 lb 9.6 oz (114.1 kg)   05/27/16 225 lb 9.6 oz (102.3 kg)   09/08/15 220 lb 14.4 oz (100.2 kg)              We Performed the Following     INJECTION INTRAMUSCULAR OR SUB-Q     VITAMIN B12 INJ /1000MCG        Primary Care Provider Office Phone # Fax #    Zackery Hope [phone] [address] [place] [address]        Equal Access to Services     [facility]: Hadii jessie das hadasho Soharish, waaxda luqadaha, qaybta kaalmada tanvi, brook morris . So [facility] [phone].    ATENCIÓN: Si habla español, tiene a nobles disposición servicios gratuitos de asistencia lingüística. [facility] [phone].    We comply with applicable federal civil rights laws and [place] laws. We do not discriminate on the basis of race, color, national origin, age, disability sex, sexual orientation or gender identity.            Thank you!     Thank you for choosing [facility]  for your care. Our goal is always to provide you with excellent care. Hearing back from our patients is one way we can continue to improve our services. Please take a few minutes to complete the written survey that you may receive in the mail after your visit with us. Thank you!             Your Updated Medication List - Protect others around you: Learn how to safely use, store and throw away your medicines at www.disposemymeds.org.          This list is accurate as of: 9/8/17  4:43 PM.  Always use your most recent med list.                   Brand Name Dispense Instructions for use Diagnosis    Beclomethasone Dipropionate 80 MCG/ACT Aers Nasal Spray    QNASL    8.7 g    Spray 2 sprays into both nostrils daily    Seasonal allergic rhinitis due to pollen, Folate deficiency, Vitamin B12 deficiency (non anemic), Allergic urticaria, Hypothyroidism due to acquired atrophy  of thyroid, Non morbid obesity due to excess calories, Hypertension goal BP (blood pressure) < 140/90, Hyperlipidemia LDL goal <130, Sjogren's syndrome (H), Neuropathy (H)       calcium 500 MG Chew      Take 500 mg by mouth daily        cholecalciferol 5000 UNITS Caps capsule    vitamin D3     Take 5,000 Units by mouth daily        cyanocobalamin 1000 MCG/ML injection    VITAMIN B12    1 mL    Inject 1 mL (1,000 mcg) into the muscle every 30 days    Vitamin B12 deficiency (non anemic), Folate deficiency, Allergic urticaria, Hypothyroidism due to acquired atrophy of thyroid, Non morbid obesity due to excess calories, Hypertension goal BP (blood pressure) < 140/90, Hyperlipidemia LDL goal <130, Sjogren's syndrome (H), Neuropathy (H), Seasonal allergic rhinitis due to pollen       folic acid 1 MG tablet    FOLVITE    90 tablet    Take 1 tablet (1 mg) by mouth daily    Folate deficiency, Vitamin B12 deficiency (non anemic), Allergic urticaria, Hypothyroidism due to acquired atrophy of thyroid, Non morbid obesity due to excess calories, Hypertension goal BP (blood pressure) < 140/90, Hyperlipidemia LDL goal <130, Sjogren's syndrome (H), Neuropathy (H), Seasonal allergic rhinitis due to pollen       levothyroxine 175 MCG tablet    SYNTHROID/LEVOTHROID    90 tablet    Take 1 tablet (175 mcg) by mouth daily    Hypothyroidism due to acquired atrophy of thyroid, Folate deficiency, Vitamin B12 deficiency (non anemic), Allergic urticaria, Non morbid obesity due to excess calories, Hypertension goal BP (blood pressure) < 140/90, Hyperlipidemia LDL goal <130, Sjogren's syndrome (H), Neuropathy (H), Seasonal allergic rhinitis due to pollen       montelukast 10 MG tablet    SINGULAIR    180 tablet    Take 1 tablet (10 mg) by mouth 2 times daily (Needs follow-up appointment for this medication)    Allergic urticaria, Folate deficiency, Vitamin B12 deficiency (non anemic), Hypothyroidism due to acquired atrophy of thyroid, Non morbid  obesity due to excess calories, Hypertension goal BP (blood pressure) < 140/90, Hyperlipidemia LDL goal <130, Sjogren's syndrome (H), Neuropathy (H), Seasonal allergic rhinitis due to pollen       WOMENS MULTI VITAMIN & MINERAL PO      Take by mouth daily

## 2017-09-08 NOTE — PROGRESS NOTES
The following medication was given:     MEDICATION: Vitamin B12  1000 mcg  ROUTE: IM  SITE: Deltoid - Left  DOSE: 1000 mcg   LOT #: [id number]  :  Myntra  EXPIRATION DATE:  09/01/2018  NDC: 9410-9235-56    Paty Amor MA

## 2017-09-18 DIAGNOSIS — E53.8 FOLATE DEFICIENCY: ICD-10-CM

## 2017-09-18 DIAGNOSIS — E53.8 VITAMIN B12 DEFICIENCY (NON ANEMIC): ICD-10-CM

## 2017-09-18 DIAGNOSIS — E66.09 NON MORBID OBESITY DUE TO EXCESS CALORIES: ICD-10-CM

## 2017-09-18 DIAGNOSIS — I10 HYPERTENSION GOAL BP (BLOOD PRESSURE) < 140/90: ICD-10-CM

## 2017-09-18 DIAGNOSIS — J30.1 SEASONAL ALLERGIC RHINITIS DUE TO POLLEN: ICD-10-CM

## 2017-09-18 DIAGNOSIS — G62.9 NEUROPATHY: ICD-10-CM

## 2017-09-18 DIAGNOSIS — E03.4 HYPOTHYROIDISM DUE TO ACQUIRED ATROPHY OF THYROID: ICD-10-CM

## 2017-09-18 DIAGNOSIS — M35.00 SJOGREN'S SYNDROME (H): ICD-10-CM

## 2017-09-18 DIAGNOSIS — L50.0 ALLERGIC URTICARIA: ICD-10-CM

## 2017-09-18 DIAGNOSIS — E78.5 HYPERLIPIDEMIA LDL GOAL <130: ICD-10-CM

## 2017-09-18 NOTE — TELEPHONE ENCOUNTER
Folic Acid 1mg      Last Written Prescription Date: 07/26/2017 #90 x 0  Last filled 07/26/2017  Last Office Visit with [facility], [facility] or  Health prescribing provider: 06/12/2017 ANGELINE Hope                                         Next 5 appointments (look out 90 days)     Oct 06, 2017  4:30 PM CDT   Nurse Only with Fl Ganga Crane/Lpn   [facility]    [address] [facility] [address] [phone]

## 2017-09-19 RX ORDER — FOLIC ACID 1 MG/1
TABLET ORAL
Qty: 90 TABLET | Refills: 0 | Status: SHIPPED | OUTPATIENT
Start: 2017-09-19 | End: 2017-12-18

## 2017-09-19 NOTE — TELEPHONE ENCOUNTER
Prescription approved per [facility] Refill Protocol.      Lidia PADILLA Rn

## 2017-10-06 ENCOUNTER — ALLIED HEALTH/NURSE VISIT (OUTPATIENT)
Dept: FAMILY MEDICINE | Facility: CLINIC | Age: 62
End: 2017-10-06
Payer: COMMERCIAL

## 2017-10-06 DIAGNOSIS — Z23 NEED FOR PROPHYLACTIC VACCINATION AND INOCULATION AGAINST INFLUENZA: Primary | ICD-10-CM

## 2017-10-06 PROCEDURE — 90686 IIV4 VACC NO PRSV 0.5 ML IM: CPT

## 2017-10-06 PROCEDURE — 99207 ZZC NO CHARGE NURSE ONLY: CPT

## 2017-10-06 PROCEDURE — 90471 IMMUNIZATION ADMIN: CPT

## 2017-10-06 NOTE — NURSING NOTE
The following medication was given:   MEDICATION: Vitamin B12  1000mcg  ROUTE: IM  SITE: Deltoid - Left  DOSE: 1000 mcg (1mL)  LOT #: [id number]  :  Linkfluence  EXPIRATION DATE:  12/2018  NDC#: 6990-1269-41   Dyan Littlejohn CMA (AAMA)

## 2017-10-06 NOTE — PROGRESS NOTES
Injectable Influenza Immunization Documentation    1.  Is the person to be vaccinated sick today?   No    2. Does the person to be vaccinated have an allergy to a component   of the vaccine?   No    3. Has the person to be vaccinated ever had a serious reaction   to influenza vaccine in the past?   No    4. Has the person to be vaccinated ever had Guillain-Barré syndrome?   No    Form completed by Dyan Littlejohn CMA ([facility])

## 2017-10-06 NOTE — MR AVS SNAPSHOT
"              After Visit Summary   10/6/2017    Leanna Griffin    MRN: [id number]           Patient Information     Date Of Birth          1955        Visit Information        Provider Department      10/6/2017 4:30 PM Royce/Lpn, [facility]        Today's Diagnoses     Need for prophylactic vaccination and inoculation against influenza    -  1       Follow-ups after your visit        Your next 10 appointments already scheduled     Oct 16, 2017  5:00 PM CDT   MA SCREENING DIGITAL BILATERAL with WYMA2   [facility] ([facility])    [address]  [facility] [address]   [phone]           Do not use any powder, lotion or deodorant under your arms or on your breast. If you do, we will ask you to remove it before your exam.  Wear comfortable, two-piece clothing.  If you have any allergies, tell your care team.  Bring any previous mammograms from other facilities or have them mailed to the breast center. Three-dimensional (3D) mammograms are available at [facility] locations in [facility], [facility], [facility], and [place]. [facility] locations include [place] [facility] in [place]. Benefits of 3D mammograms include: - Improved rate of cancer detection - Decreases your chance of having to go back for more tests, which means fewer: - \"False-positive\" results (This means that there is an abnormal area but it isn't cancer.) - Invasive testing procedures, such as a biopsy or surgery - Can provide clearer images of the breast if you have dense breast tissue. 3D mammography is an optional exam that anyone can have with a 2D mammogram. It doesn't replace or take the place of a 2D mammogram. 2D mammograms remain an effective screening test for all women.  Not all insurance companies cover the cost of a 3D mammogram. Check with your insurance.            Nov 03, 2017  4:30 PM CDT   Nurse Only with Fl Ll " Cma/Lpn   [facility] ([facility]    [address] [address] [phone]              Who to contact     Normal or non-critical lab and imaging results will be communicated to you by MyChart, letter or phone within 4 business days after the clinic has received the results. If you do not hear from us within 7 days, please contact the clinic through MyChart or phone. If you have a critical or abnormal lab result, we will notify you by phone as soon as possible.  Submit refill requests through Wi-Chi or call your pharmacy and they will forward the refill request to us. Please allow 3 business days for your refill to be completed.          If you need to speak with a  for additional information , please call: [phone]           Additional Information About Your Visit        Wi-Chi Information     Wi-Chi gives you secure access to your electronic health record. If you see a primary care provider, you can also send messages to your care team and make appointments. If you have questions, please call your primary care clinic.  If you do not have a primary care provider, please call [phone] and they will assist you.        Care EveryWhere ID     This is your Care EveryWhere ID. This could be used by other organizations to access your [place] medical records  [id number]        Your Vitals Were     Last Period                   06/23/2006            Blood Pressure from Last 3 Encounters:   06/12/17 137/84   05/27/16 117/73   02/08/16 108/60    Weight from Last 3 Encounters:   06/12/17 251 lb 9.6 oz (114.1 kg)   05/27/16 225 lb 9.6 oz (102.3 kg)   09/08/15 220 lb 14.4 oz (100.2 kg)              We Performed the Following     FLU VAC, SPLIT VIRUS IM > 3 YO (QUADRIVALENT) [15478]     Vaccine Administration, Initial [31675]        Primary Care Provider Office Phone # Fax #    Zackery Hope [phone] [address] [place] [address]        Equal Access to Services     [facility] : Hadii jessie das hadheather Soalecali, waaxda luqadaha, qaybta kaalmada viktorlaurelke, brook mendezumufely boyle. So [facility] [phone].    ATENCIÓN: Si habla español, tiene a nobles disposición servicios gratuitos de asistencia lingüística. [facility] [phone].    We comply with applicable federal civil rights laws and [place] laws. We do not discriminate on the basis of race, color, national origin, age, disability, sex, sexual orientation, or gender identity.            Thank you!     Thank you for choosing [facility]  for your care. Our goal is always to provide you with excellent care. Hearing back from our patients is one way we can continue to improve our services. Please take a few minutes to complete the written survey that you may receive in the mail after your visit with us. Thank you!             Your Updated Medication List - Protect others around you: Learn how to safely use, store and throw away your medicines at www.disposemymeds.org.          This list is accurate as of: 10/6/17  4:49 PM.  Always use your most recent med list.                   Brand Name Dispense Instructions for use Diagnosis    Beclomethasone Dipropionate 80 MCG/ACT Aers Nasal Spray    QNASL    8.7 g    Spray 2 sprays into both nostrils daily    Seasonal allergic rhinitis due to pollen, Folate deficiency, Vitamin B12 deficiency (non anemic), Allergic urticaria, Hypothyroidism due to acquired atrophy of thyroid, Non morbid obesity due to excess calories, Hypertension goal BP (blood pressure) < 140/90, Hyperlipidemia LDL goal <130, Sjogren's syndrome (H), Neuropathy       calcium 500 MG Chew      Take 500 mg by mouth daily        cholecalciferol 5000 UNITS Caps capsule    vitamin D3     Take 5,000 Units by mouth daily        cyanocobalamin 1000 MCG/ML injection    VITAMIN B12    1 mL    Inject 1 mL (1,000 mcg) into the muscle every  30 days    Vitamin B12 deficiency (non anemic), Folate deficiency, Allergic urticaria, Hypothyroidism due to acquired atrophy of thyroid, Non morbid obesity due to excess calories, Hypertension goal BP (blood pressure) < 140/90, Hyperlipidemia LDL goal <130, Sjogren's syndrome (H), Neuropathy, Seasonal allergic rhinitis due to pollen       folic acid 1 MG tablet    FOLVITE    90 tablet    TAKE ONE TABLET BY MOUTH EVERY DAY    Folate deficiency, Vitamin B12 deficiency (non anemic), Allergic urticaria, Hypothyroidism due to acquired atrophy of thyroid, Non morbid obesity due to excess calories, Hypertension goal BP (blood pressure) < 140/90, Hyperlipidemia LDL goal <130, Sjogren's syndrome (H), Neuropathy, Seasonal allergic rhinitis due to pollen       levothyroxine 175 MCG tablet    SYNTHROID/LEVOTHROID    90 tablet    Take 1 tablet (175 mcg) by mouth daily    Hypothyroidism due to acquired atrophy of thyroid, Folate deficiency, Vitamin B12 deficiency (non anemic), Allergic urticaria, Non morbid obesity due to excess calories, Hypertension goal BP (blood pressure) < 140/90, Hyperlipidemia LDL goal <130, Sjogren's syndrome (H), Neuropathy, Seasonal allergic rhinitis due to pollen       montelukast 10 MG tablet    SINGULAIR    180 tablet    Take 1 tablet (10 mg) by mouth 2 times daily (Needs follow-up appointment for this medication)    Allergic urticaria, Folate deficiency, Vitamin B12 deficiency (non anemic), Hypothyroidism due to acquired atrophy of thyroid, Non morbid obesity due to excess calories, Hypertension goal BP (blood pressure) < 140/90, Hyperlipidemia LDL goal <130, Sjogren's syndrome (H), Neuropathy, Seasonal allergic rhinitis due to pollen       WOMENS MULTI VITAMIN & MINERAL PO      Take by mouth daily

## 2017-10-16 ENCOUNTER — HOSPITAL ENCOUNTER (OUTPATIENT)
Dept: MAMMOGRAPHY | Facility: CLINIC | Age: 62
Discharge: HOME | End: 2017-10-16
Attending: FAMILY MEDICINE | Admitting: FAMILY MEDICINE
Payer: COMMERCIAL

## 2017-10-16 DIAGNOSIS — Z12.31 VISIT FOR SCREENING MAMMOGRAM: ICD-10-CM

## 2017-10-16 PROCEDURE — G0202 SCR MAMMO BI INCL CAD: HCPCS

## 2017-11-03 ENCOUNTER — ALLIED HEALTH/NURSE VISIT (OUTPATIENT)
Dept: FAMILY MEDICINE | Facility: CLINIC | Age: 62
End: 2017-11-03
Payer: COMMERCIAL

## 2017-11-03 DIAGNOSIS — E53.8 VITAMIN B12 DEFICIENCY (NON ANEMIC): Primary | ICD-10-CM

## 2017-11-03 PROCEDURE — 99207 ZZC NO CHARGE NURSE ONLY: CPT

## 2017-11-03 PROCEDURE — 90471 IMMUNIZATION ADMIN: CPT

## 2017-11-03 NOTE — MR AVS SNAPSHOT
After Visit Summary   11/3/2017    Leanna Griffin    MRN: [id number]           Patient Information     Date Of Birth          1955        Visit Information        Provider Department      11/3/2017 4:30 PM Cma/Lpn, Fl Ll [facility]        Today's Diagnoses     Vitamin B12 deficiency (non anemic)    -  1       Follow-ups after your visit        Your next 10 appointments already scheduled     Dec 01, 2017  4:30 PM CST   Nurse Only with Fl Ll Cma/Lpn   [facility]    [address] [facility] [address]   [phone]              Who to contact     Normal or non-critical lab and imaging results will be communicated to you by InvestLabhart, letter or phone within 4 business days after the clinic has received the results. If you do not hear from us within 7 days, please contact the clinic through InvestLabhart or phone. If you have a critical or abnormal lab result, we will notify you by phone as soon as possible.  Submit refill requests through Studiekring or call your pharmacy and they will forward the refill request to us. Please allow 3 business days for your refill to be completed.          If you need to speak with a  for additional information , please call: [phone]           Additional Information About Your Visit        InvestLabhart Information     Studiekring gives you secure access to your electronic health record. If you see a primary care provider, you can also send messages to your care team and make appointments. If you have questions, please call your primary care clinic.  If you do not have a primary care provider, please call [phone] and they will assist you.        Care EveryWhere ID     This is your Care EveryWhere ID. This could be used by other organizations to access your [place] medical records  [id number]        Your Vitals Were     Last Period                   06/23/2006            Blood Pressure from  Last 3 Encounters:   06/12/17 137/84   05/27/16 117/73   02/08/16 108/60    Weight from Last 3 Encounters:   06/12/17 251 lb 9.6 oz (114.1 kg)   05/27/16 225 lb 9.6 oz (102.3 kg)   09/08/15 220 lb 14.4 oz (100.2 kg)              We Performed the Following     VACCINE ADMINISTRATION, INITIAL     VITAMIN B12 INJ /1000MCG        Primary Care Provider Office Phone # Fax #    Zackery Hope [phone]       [address] [place] [address]        Equal Access to Services     [facility]: Hadii aad thiago hadjuanyo Edd, waaxda luqadaha, qaybta kaalmada tanvi, brook morris . So [facility] [phone].    ATENCIÓN: Si habla español, tiene a nobles disposición servicios gratuitos de asistencia lingüística. [facility] [phone].    We comply with applicable federal civil rights laws and [place] laws. We do not discriminate on the basis of race, color, national origin, age, disability, sex, sexual orientation, or gender identity.            Thank you!     Thank you for choosing [facility]  for your care. Our goal is always to provide you with excellent care. Hearing back from our patients is one way we can continue to improve our services. Please take a few minutes to complete the written survey that you may receive in the mail after your visit with us. Thank you!             Your Updated Medication List - Protect others around you: Learn how to safely use, store and throw away your medicines at www.disposemymeds.org.          This list is accurate as of: 11/3/17  4:37 PM.  Always use your most recent med list.                   Brand Name Dispense Instructions for use Diagnosis    Beclomethasone Dipropionate 80 MCG/ACT Aers Nasal Spray    QNASL    8.7 g    Spray 2 sprays into both nostrils daily    Seasonal allergic rhinitis due to pollen, Folate deficiency, Vitamin B12 deficiency (non anemic), Allergic urticaria, Hypothyroidism due to acquired atrophy  of thyroid, Non morbid obesity due to excess calories, Hypertension goal BP (blood pressure) < 140/90, Hyperlipidemia LDL goal <130, Sjogren's syndrome (H), Neuropathy       calcium 500 MG Chew      Take 500 mg by mouth daily        cholecalciferol 5000 UNITS Caps capsule    vitamin D3     Take 5,000 Units by mouth daily        cyanocobalamin 1000 MCG/ML injection    VITAMIN B12    1 mL    Inject 1 mL (1,000 mcg) into the muscle every 30 days    Vitamin B12 deficiency (non anemic), Folate deficiency, Allergic urticaria, Hypothyroidism due to acquired atrophy of thyroid, Non morbid obesity due to excess calories, Hypertension goal BP (blood pressure) < 140/90, Hyperlipidemia LDL goal <130, Sjogren's syndrome (H), Neuropathy, Seasonal allergic rhinitis due to pollen       folic acid 1 MG tablet    FOLVITE    90 tablet    TAKE ONE TABLET BY MOUTH EVERY DAY    Folate deficiency, Vitamin B12 deficiency (non anemic), Allergic urticaria, Hypothyroidism due to acquired atrophy of thyroid, Non morbid obesity due to excess calories, Hypertension goal BP (blood pressure) < 140/90, Hyperlipidemia LDL goal <130, Sjogren's syndrome (H), Neuropathy, Seasonal allergic rhinitis due to pollen       levothyroxine 175 MCG tablet    SYNTHROID/LEVOTHROID    90 tablet    Take 1 tablet (175 mcg) by mouth daily    Hypothyroidism due to acquired atrophy of thyroid, Folate deficiency, Vitamin B12 deficiency (non anemic), Allergic urticaria, Non morbid obesity due to excess calories, Hypertension goal BP (blood pressure) < 140/90, Hyperlipidemia LDL goal <130, Sjogren's syndrome (H), Neuropathy, Seasonal allergic rhinitis due to pollen       montelukast 10 MG tablet    SINGULAIR    180 tablet    Take 1 tablet (10 mg) by mouth 2 times daily (Needs follow-up appointment for this medication)    Allergic urticaria, Folate deficiency, Vitamin B12 deficiency (non anemic), Hypothyroidism due to acquired atrophy of thyroid, Non morbid obesity due to  excess calories, Hypertension goal BP (blood pressure) < 140/90, Hyperlipidemia LDL goal <130, Sjogren's syndrome (H), Neuropathy, Seasonal allergic rhinitis due to pollen       WOMENS MULTI VITAMIN & MINERAL PO      Take by mouth daily

## 2017-11-03 NOTE — PROGRESS NOTES
The following medication was given:     MEDICATION: Vitamin B12  1000mcg  ROUTE: IM  SITE: Deltoid - Right  DOSE: mL  LOT #: [id number]  :  Dimmi  EXPIRATION DATE:  2/1/19  NDC#: 8502-4873-97

## 2017-12-01 ENCOUNTER — ALLIED HEALTH/NURSE VISIT (OUTPATIENT)
Dept: FAMILY MEDICINE | Facility: CLINIC | Age: 62
End: 2017-12-01
Payer: COMMERCIAL

## 2017-12-01 DIAGNOSIS — E53.8 VITAMIN B12 DEFICIENCY (NON ANEMIC): Primary | ICD-10-CM

## 2017-12-01 PROCEDURE — 96372 THER/PROPH/DIAG INJ SC/IM: CPT

## 2017-12-01 PROCEDURE — 99207 ZZC NO CHARGE NURSE ONLY: CPT

## 2017-12-18 ENCOUNTER — OFFICE VISIT (OUTPATIENT)
Dept: FAMILY MEDICINE | Facility: CLINIC | Age: 62
End: 2017-12-18
Payer: COMMERCIAL

## 2017-12-18 VITALS
DIASTOLIC BLOOD PRESSURE: 68 MMHG | WEIGHT: 250.8 LBS | HEART RATE: 96 BPM | BODY MASS INDEX: 43.73 KG/M2 | SYSTOLIC BLOOD PRESSURE: 120 MMHG | TEMPERATURE: 97.3 F

## 2017-12-18 DIAGNOSIS — D22.9 ATYPICAL MOLE: ICD-10-CM

## 2017-12-18 DIAGNOSIS — J30.1 CHRONIC SEASONAL ALLERGIC RHINITIS DUE TO POLLEN: ICD-10-CM

## 2017-12-18 DIAGNOSIS — E78.5 HYPERLIPIDEMIA LDL GOAL <130: ICD-10-CM

## 2017-12-18 DIAGNOSIS — M35.03 SJOGREN'S SYNDROME WITH MYOPATHY (H): ICD-10-CM

## 2017-12-18 DIAGNOSIS — G62.9 NEUROPATHY: ICD-10-CM

## 2017-12-18 DIAGNOSIS — E03.4 HYPOTHYROIDISM DUE TO ACQUIRED ATROPHY OF THYROID: ICD-10-CM

## 2017-12-18 DIAGNOSIS — E53.8 VITAMIN B12 DEFICIENCY (NON ANEMIC): Primary | ICD-10-CM

## 2017-12-18 DIAGNOSIS — E53.8 FOLATE DEFICIENCY: ICD-10-CM

## 2017-12-18 DIAGNOSIS — I10 HYPERTENSION GOAL BP (BLOOD PRESSURE) < 140/90: ICD-10-CM

## 2017-12-18 DIAGNOSIS — L50.0 ALLERGIC URTICARIA: ICD-10-CM

## 2017-12-18 DIAGNOSIS — Z80.8 FAMILY HISTORY OF MALIGNANT MELANOMA: ICD-10-CM

## 2017-12-18 PROCEDURE — 84443 ASSAY THYROID STIM HORMONE: CPT | Performed by: NURSE PRACTITIONER

## 2017-12-18 PROCEDURE — 84439 ASSAY OF FREE THYROXINE: CPT | Performed by: NURSE PRACTITIONER

## 2017-12-18 PROCEDURE — 36415 COLL VENOUS BLD VENIPUNCTURE: CPT | Performed by: NURSE PRACTITIONER

## 2017-12-18 PROCEDURE — 99214 OFFICE O/P EST MOD 30 MIN: CPT | Performed by: NURSE PRACTITIONER

## 2017-12-18 RX ORDER — CYANOCOBALAMIN 1000 UG/ML
1 INJECTION, SOLUTION INTRAMUSCULAR; SUBCUTANEOUS
Qty: 1 ML | Refills: 11 | Status: SHIPPED | OUTPATIENT
Start: 2017-12-18 | End: 2017-12-18

## 2017-12-18 RX ORDER — CYANOCOBALAMIN 1000 UG/ML
0.5 INJECTION, SOLUTION INTRAMUSCULAR; SUBCUTANEOUS
Qty: 1 ML | Refills: 11 | Status: SHIPPED | OUTPATIENT
Start: 2017-12-18 | End: 2018-12-30

## 2017-12-18 RX ORDER — FOLIC ACID 1 MG/1
1000 TABLET ORAL DAILY
Qty: 90 TABLET | Refills: 3 | Status: SHIPPED | OUTPATIENT
Start: 2017-12-18 | End: 2019-03-25

## 2017-12-18 RX ORDER — MONTELUKAST SODIUM 10 MG/1
1 TABLET ORAL 2 TIMES DAILY
Qty: 180 TABLET | Refills: 3 | Status: SHIPPED | OUTPATIENT
Start: 2017-12-18

## 2017-12-18 RX ORDER — SIMVASTATIN 40 MG
40 TABLET ORAL AT BEDTIME
Qty: 90 TABLET | Refills: 2 | Status: SHIPPED | OUTPATIENT
Start: 2017-12-18

## 2017-12-18 RX ORDER — FOLIC ACID 1 MG/1
1000 TABLET ORAL DAILY
Qty: 90 TABLET | Refills: 0 | Status: SHIPPED | OUTPATIENT
Start: 2017-12-18 | End: 2017-12-18

## 2017-12-18 ASSESSMENT — ENCOUNTER SYMPTOMS
ABDOMINAL PAIN: 0
SORE THROAT: 0
VOMITING: 0
SHORTNESS OF BREATH: 0
CONSTIPATION: 0
PALPITATIONS: 0
WHEEZING: 0
COUGH: 0
ABDOMINAL DISTENTION: 0
HEMATURIA: 1
RHINORRHEA: 0
FATIGUE: 0
DIZZINESS: 0
NAUSEA: 0
CHEST TIGHTNESS: 0
MYALGIAS: 0
DIARRHEA: 0
ARTHRALGIAS: 0
LIGHT-HEADEDNESS: 0
HEADACHES: 0
NUMBNESS: 1

## 2017-12-18 NOTE — NURSING NOTE
"Chief Complaint   Patient presents with     Mole     Mole to center of chest since summer. Seems to be getting bigger.        Initial /68 (BP Location: Left arm, Patient Position: Sitting, Cuff Size: Adult Large)  Pulse 96  Temp 97.3  F (36.3  C) (Tympanic)  Wt 250 lb 12.8 oz (113.8 kg)  LMP 06/23/2006  BMI 43.73 kg/m2 Estimated body mass index is 43.73 kg/(m^2) as calculated from the following:    Height as of 6/12/17: 5' 3.5\" (1.613 m).    Weight as of this encounter: 250 lb 12.8 oz (113.8 kg).  Medication Reconciliation: complete     April RACHEL Calles      "

## 2017-12-18 NOTE — LETTER
[facility]  [address] [address] [phone]        December 26, 2018    Leanna Griffin  [address] [facility] [address]              Dear Leanna Griffin    This is to remind you that your non fasting lab is due.    You may call our office at [phone] to schedule an appointment.    Please disregard this notice if you have already had your labs drawn or made an appointment.        Sincerely,        Tahira Maxwell RN, CNP

## 2017-12-18 NOTE — MR AVS SNAPSHOT
After Visit Summary   12/18/2017    Leanna Griffin    MRN: [id number]           Patient Information     Date Of Birth          1955        Visit Information        Provider Department      12/18/2017 5:20 PM Tahira Maxwell APRN CNP [facility]        Today's Diagnoses     Vitamin B12 deficiency (non anemic)    -  1    Hypothyroidism due to acquired atrophy of thyroid        Hyperlipidemia LDL goal <130        Hypertension goal BP (blood pressure) < 140/90        Atypical mole        Folate deficiency        Allergic urticaria        Non morbid obesity due to excess calories        Sjogren's syndrome with myopathy (H)        Neuropathy        Chronic seasonal allergic rhinitis due to pollen           Follow-ups after your visit        Additional Services     DERMATOLOGY REFERRAL       Your provider has referred you to: [facility]: [facility] [phone]   [email or web address]  [facility] [place] [phone]   [email or web address]  [facility] [phone]   [email or web address]    Please be aware that coverage of these services is subject to the terms and limitations of your health insurance plan.  Call member services at your health plan with any benefit or coverage questions.      Please bring the following with you to your appointment:    (1) Any X-Rays, CTs or MRIs which have been performed.  Contact the facility where they were done to arrange for  prior to your scheduled appointment.    (2) List of current medications  (3) This referral request   (4) Any documents/labs given to you for this referral                  Your next 10 appointments already scheduled     Dec 29, 2017  4:30 PM CST   Nurse Only with Fl Ll Cma/Lpn   [facility] ([facility])    [address] [address] [phone]               Future tests that were ordered for you today     Open Future Orders        Priority Expected Expires Ordered    Vitamin B12 Routine  12/18/2018 12/18/2017    ALT Routine  12/18/2018 12/18/2017    AST Routine  12/18/2018 12/18/2017    Lipid panel reflex to direct LDL Fasting Routine  12/18/2018 12/18/2017            Who to contact     Normal or non-critical lab and imaging results will be communicated to you by [employer], letter or phone within 4 business days after the clinic has received the results. If you do not hear from us within 7 days, please contact the clinic through [facility] or phone. If you have a critical or abnormal lab result, we will notify you by phone as soon as possible.  Submit refill requests through [employer] or call your pharmacy and they will forward the refill request to us. Please allow 3 business days for your refill to be completed.          If you need to speak with a  for additional information , please call: [phone]           Additional Information About Your Visit        [employer] Information     [employer] gives you secure access to your electronic health record. If you see a primary care provider, you can also send messages to your care team and make appointments. If you have questions, please call your primary care clinic.  If you do not have a primary care provider, please call [phone] and they will assist you.        Care EveryWhere ID     This is your Care EveryWhere ID. This could be used by other organizations to access your [place] medical records  [id number]        Your Vitals Were     Pulse Temperature Last Period BMI (Body Mass Index)          96 97.3  F (36.3  C) (Tympanic) 06/23/2006 43.73 kg/m2         Blood Pressure from Last 3 Encounters:   12/18/17 120/68   06/12/17 137/84   05/27/16 117/73    Weight from Last 3 Encounters:   12/18/17 250 lb 12.8 oz (113.8 kg)   06/12/17 251 lb 9.6 oz (114.1 kg)   05/27/16 225 lb 9.6 oz (102.3 kg)               We Performed the Following     DERMATOLOGY REFERRAL     TSH with free T4 reflex          Today's Medication Changes          These changes are accurate as of: 12/18/17  5:29 PM.  If you have any questions, ask your nurse or doctor.               Start taking these medicines.        Dose/Directions    cyanocobalamin 1000 MCG/ML injection   Commonly known as:  VITAMIN B12   Used for:  Vitamin B12 deficiency (non anemic), Folate deficiency, Allergic urticaria, Hypothyroidism due to acquired atrophy of thyroid, Non morbid obesity due to excess calories, Hypertension goal BP (blood pressure) < 140/90, Hyperlipidemia LDL goal <130, Sjogren's syndrome with myopathy (H), Neuropathy, Chronic seasonal allergic rhinitis due to pollen   Started by:  Tahira Maxwell APRN CNP        Dose:  0.5 mL   Inject 0.5 mLs (500 mcg) into the muscle every 30 days Administer 2 weeks after 1000 mcg dose   Quantity:  1 mL   Refills:  11       folic acid 1 MG tablet   Commonly known as:  FOLVITE   Used for:  Folate deficiency, Vitamin B12 deficiency (non anemic), Allergic urticaria, Hypothyroidism due to acquired atrophy of thyroid, Non morbid obesity due to excess calories, Hypertension goal BP (blood pressure) < 140/90, Hyperlipidemia LDL goal <130, Sjogren's syndrome with myopathy (H), Neuropathy, Chronic seasonal allergic rhinitis due to pollen   Started by:  Tahria Maxwell APRN CNP        Dose:  1000 mcg   Take 1 tablet (1,000 mcg) by mouth daily   Quantity:  90 tablet   Refills:  3       simvastatin 40 MG tablet   Commonly known as:  ZOCOR   Used for:  Hyperlipidemia LDL goal <130   Started by:  Tahira Maxwell APRN CNP        Dose:  40 mg   Take 1 tablet (40 mg) by mouth At Bedtime   Quantity:  90 tablet   Refills:  2            Where to get your medicines      Some of these will need a paper prescription and others can be bought over the counter.  Ask your nurse if you have questions.     Bring a paper  prescription for each of these medications     cyanocobalamin 1000 MCG/ML injection    folic acid 1 MG tablet    simvastatin 40 MG tablet                Primary Care Provider Office Phone # Fax #    Zackery Hope [phone] [address] [place] [address]        Equal Access to Services     [facility] ALEXEY : Hadii aad ku hadasho Soomaali, waaxda luqadaha, qaybta kaalmada adeegyada, waxay idiin hayaan adeeg atafely ladariusn . So [facility] [phone].    ATENCIÓN: Si habla español, tiene a nobles disposición servicios gratuitos de asistencia lingüística. Llame al [phone].    We comply with applicable federal civil rights laws and [place] laws. We do not discriminate on the basis of race, color, national origin, age, disability, sex, sexual orientation, or gender identity.            Thank you!     Thank you for choosing [facility]  for your care. Our goal is always to provide you with excellent care. Hearing back from our patients is one way we can continue to improve our services. Please take a few minutes to complete the written survey that you may receive in the mail after your visit with us. Thank you!             Your Updated Medication List - Protect others around you: Learn how to safely use, store and throw away your medicines at www.disposemymeds.org.          This list is accurate as of: 12/18/17  5:29 PM.  Always use your most recent med list.                   Brand Name Dispense Instructions for use Diagnosis    Beclomethasone Dipropionate 80 MCG/ACT Aers Nasal Spray    QNASL    8.7 g    Spray 2 sprays into both nostrils daily    Seasonal allergic rhinitis due to pollen, Folate deficiency, Vitamin B12 deficiency (non anemic), Allergic urticaria, Hypothyroidism due to acquired atrophy of thyroid, Non morbid obesity due to excess calories, Hypertension goal BP (blood pressure) < 140/90, Hyperlipidemia LDL goal <130, Sjogren's syndrome (H), Neuropathy        calcium 500 MG Chew      Take 500 mg by mouth daily        cholecalciferol 5000 UNITS Caps capsule    vitamin D3     Take 5,000 Units by mouth daily        cyanocobalamin 1000 MCG/ML injection    VITAMIN B12    1 mL    Inject 0.5 mLs (500 mcg) into the muscle every 30 days Administer 2 weeks after 1000 mcg dose    Vitamin B12 deficiency (non anemic), Folate deficiency, Allergic urticaria, Hypothyroidism due to acquired atrophy of thyroid, Non morbid obesity due to excess calories, Hypertension goal BP (blood pressure) < 140/90, Hyperlipidemia LDL goal <130, Sjogren's syndrome with myopathy (H), Neuropathy, Chronic seasonal allergic rhinitis due to pollen       folic acid 1 MG tablet    FOLVITE    90 tablet    Take 1 tablet (1,000 mcg) by mouth daily    Folate deficiency, Vitamin B12 deficiency (non anemic), Allergic urticaria, Hypothyroidism due to acquired atrophy of thyroid, Non morbid obesity due to excess calories, Hypertension goal BP (blood pressure) < 140/90, Hyperlipidemia LDL goal <130, Sjogren's syndrome with myopathy (H), Neuropathy, Chronic seasonal allergic rhinitis due to pollen       levothyroxine 175 MCG tablet    SYNTHROID/LEVOTHROID    90 tablet    Take 1 tablet (175 mcg) by mouth daily    Hypothyroidism due to acquired atrophy of thyroid, Folate deficiency, Vitamin B12 deficiency (non anemic), Allergic urticaria, Non morbid obesity due to excess calories, Hypertension goal BP (blood pressure) < 140/90, Hyperlipidemia LDL goal <130, Sjogren's syndrome (H), Neuropathy, Seasonal allergic rhinitis due to pollen       montelukast 10 MG tablet    SINGULAIR    180 tablet    Take 1 tablet (10 mg) by mouth 2 times daily (Needs follow-up appointment for this medication)    Allergic urticaria, Folate deficiency, Vitamin B12 deficiency (non anemic), Hypothyroidism due to acquired atrophy of thyroid, Non morbid obesity due to excess calories, Hypertension goal BP (blood pressure) < 140/90, Hyperlipidemia LDL  goal <130, Sjogren's syndrome (H), Neuropathy, Seasonal allergic rhinitis due to pollen       simvastatin 40 MG tablet    ZOCOR    90 tablet    Take 1 tablet (40 mg) by mouth At Bedtime    Hyperlipidemia LDL goal <130       WOMENS MULTI VITAMIN & MINERAL PO      Take by mouth daily

## 2017-12-18 NOTE — PROGRESS NOTES
SUBJECTIVE:   Leanna Griffin is a 62 year old female who presents to clinic today for the following health issues:      Chief Complaint   Patient presents with     Mole     Mole to center of chest since summer. Seems to be getting bigger.      Would like instructions to give herself B12 injections.       Problem list and histories reviewed & adjusted, as indicated.  Additional history:     Current Outpatient Prescriptions   Medication Sig Dispense Refill     simvastatin (ZOCOR) 40 MG tablet Take 1 tablet (40 mg) by mouth At Bedtime 90 tablet 2     cyanocobalamin (VITAMIN B12) 1000 MCG/ML injection Inject 0.5 mLs (500 mcg) into the muscle every 30 days Administer 2 weeks after 1000 mcg dose 1 mL 11     folic acid (FOLVITE) 1 MG tablet Take 1 tablet (1,000 mcg) by mouth daily 90 tablet 3     montelukast (SINGULAIR) 10 MG tablet Take 1 tablet (10 mg) by mouth 2 times daily 180 tablet 3     Beclomethasone Dipropionate (QNASL) 80 MCG/ACT AERS Nasal Spray Spray 2 sprays into both nostrils daily 8.7 g 3     levothyroxine (SYNTHROID/LEVOTHROID) 175 MCG tablet Take 1 tablet (175 mcg) by mouth daily 90 tablet 1     cholecalciferol (VITAMIN D3) 5000 UNITS CAPS capsule Take 5,000 Units by mouth daily       Multiple Vitamins-Minerals (WOMENS MULTI VITAMIN & MINERAL PO) Take by mouth daily       calcium 500 MG CHEW Take 500 mg by mouth daily       [DISCONTINUED] cyanocobalamin (VITAMIN B12) 1000 MCG/ML injection Inject 1 mL (1,000 mcg) into the muscle every 30 days 1 mL 11     [DISCONTINUED] montelukast (SINGULAIR) 10 MG tablet Take 1 tablet (10 mg) by mouth 2 times daily (Needs follow-up appointment for this medication) 180 tablet 3     [DISCONTINUED] cyanocobalamin (VITAMIN B12) 1000 MCG/ML injection Inject 1 mL (1,000 mcg) into the muscle every 30 days 1 mL 11     Allergies   Allergen Reactions     Clavulanic Acid        Reviewed and updated as needed this visit by clinical staff  Tobacco  Allergies  Meds  Med Hx  Surg Hx   Fam Hx  Soc Hx      Reviewed and updated as needed this visit by Provider          Leaving for [place] in Jan. Has a home there. Is retiring Jan 2nd in planning to leave January 3! Plans to live there for 9 months out of the year. Plans to keep house here as well.  Father lives in [place] and will be living 3 houses down from him.  Has a daughter that lives here who is planning to keep an eye on her house.   will be retiring as well.  Plans to establish care with an internal medicine physician in [place].    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 Went to [facility] and was thought to be related to B12.  Started be 12 injections approximately 2 years ago.  Usually gets them at the beginning of the month.  Towards the end of the month notices that neuropathy starts to return significantly, pain increases.  Always has to get injections on Fridays because the next 2 days hurts a lot.  Would like to learn how to give B12 injections on her own so does not have to go to our office.    Was taking statin medication in the past.  Stopped it when neuropathy originally developed over 2 years ago.  After stop statin medication did not notice a change in neuropathy, has never restarted it.    Has seen rheumatology in the past but not recently.  When got treated for B12 deficiency symptoms improved significantly.  Does still continue to have dry mouth and dry eyes but is much better than had been.  Follows closely with dentist.    Needs to have TSH rechecked.  Last time was checked in June was very elevated and had medication dose increased.    Has a mole to center of chest. First appeared This summer.  Father does have a family history of malignant melanoma.  Over the past few months has been getting larger in size.    Last Pap in 2015 normal, HPV negative.  Mammogram is up-to-date.      ROS:  Review of Systems   Constitutional: Negative for fatigue.   HENT: Negative for ear pain, rhinorrhea and sore throat.    Eyes: Negative for  visual disturbance.   Respiratory: Negative for cough, chest tightness, shortness of breath and wheezing.    Cardiovascular: Negative for chest pain, palpitations and leg swelling.   Gastrointestinal: Negative for abdominal distention, abdominal pain, constipation, diarrhea, nausea and vomiting.   Endocrine: Negative for cold intolerance and heat intolerance.   Genitourinary: Positive for hematuria.   Musculoskeletal: Negative for arthralgias and myalgias.   Skin: Negative for rash.        Mole to mid chest     Neurological: Positive for numbness (from waist down ). Negative for dizziness, light-headedness and headaches.         OBJECTIVE:     /68 (BP Location: Left arm, Patient Position: Sitting, Cuff Size: Adult Large)  Pulse 96  Temp 97.3  F (36.3  C) (Tympanic)  Wt 250 lb 12.8 oz (113.8 kg)  LMP 06/23/2006  BMI 43.73 kg/m2  Body mass index is 43.73 kg/(m^2).  Physical Exam   Constitutional: She appears well-developed and well-nourished.   HENT:   Head: Normocephalic and atraumatic.   Right Ear: Tympanic membrane and external ear normal. No middle ear effusion.   Left Ear: Tympanic membrane and external ear normal.  No middle ear effusion.   Nose: No mucosal edema.   Mouth/Throat: Oropharynx is clear and moist and mucous membranes are normal.   Neck: Carotid bruit is not present. No thyromegaly present.   Cardiovascular: Normal rate, regular rhythm and normal heart sounds.    Pulmonary/Chest: Effort normal and breath sounds normal.       Abdominal: Soft. Normal appearance and bowel sounds are normal.   Neurological: She is alert.   Skin: Skin is warm and dry.   Psychiatric: She has a normal mood and affect. Her behavior is normal.       ASSESSMENT/PLAN:   1. Vitamin B12 deficiency (non anemic)  We will plan nurse visit on December 29.  Educated on administration of B12 to self at that time.  We will have injected self that day with B12.  Previous B12 labs reviewed-low end of normal around 230.  Plan to  increase vitamin B12.  We will continue with thousand micrograms at the beginning of the month and plan to administer 500 mcg midmonth.  Should plan to recheck vitamin B12 level in 3 months.  Paper copy of lab orders given to have drawn when in [place] and fax results here.  - cyanocobalamin (VITAMIN B12) 1000 MCG/ML injection; Inject 0.5 mLs (500 mcg) into the muscle every 30 days Administer 2 weeks after 1000 mcg dose  Dispense: 1 mL; Refill: 11  - Vitamin B12; Future  - folic acid (FOLVITE) 1 MG tablet; Take 1 tablet (1,000 mcg) by mouth daily  Dispense: 90 tablet; Refill: 3    2. Hypothyroidism due to acquired atrophy of thyroid  Plan to recheck today.  We will give orders for levothyroxine when lab results return.  - cyanocobalamin (VITAMIN B12) 1000 MCG/ML injection; Inject 0.5 mLs (500 mcg) into the muscle every 30 days Administer 2 weeks after 1000 mcg dose  Dispense: 1 mL; Refill: 11  - TSH with free T4 reflex  - folic acid (FOLVITE) 1 MG tablet; Take 1 tablet (1,000 mcg) by mouth daily  Dispense: 90 tablet; Refill: 3    3. Hyperlipidemia LDL goal <130  Agreeable to restarting simvastatin today.  Encouraged to notify via my chart if neuropathy symptoms worsen.  Plan to recheck fasting lipids, AST, and ALT in 3 months.  Paper copies of labs given.  Should fax results to office.  - simvastatin (ZOCOR) 40 MG tablet; Take 1 tablet (40 mg) by mouth At Bedtime  Dispense: 90 tablet; Refill: 2  - ALT; Future  - AST; Future  - Lipid panel reflex to direct LDL Fasting; Future  - cyanocobalamin (VITAMIN B12) 1000 MCG/ML injection; Inject 0.5 mLs (500 mcg) into the muscle every 30 days Administer 2 weeks after 1000 mcg dose  Dispense: 1 mL; Refill: 11  - folic acid (FOLVITE) 1 MG tablet; Take 1 tablet (1,000 mcg) by mouth daily  Dispense: 90 tablet; Refill: 3    4. Hypertension goal BP (blood pressure) < 140/90  Blood pressure well controlled off medications.  - cyanocobalamin (VITAMIN B12) 1000 MCG/ML injection; Inject  0.5 mLs (500 mcg) into the muscle every 30 days Administer 2 weeks after 1000 mcg dose  Dispense: 1 mL; Refill: 11  - folic acid (FOLVITE) 1 MG tablet; Take 1 tablet (1,000 mcg) by mouth daily  Dispense: 90 tablet; Refill: 3    5. Atypical mole  Plan to refer to dermatology for removal  - DERMATOLOGY REFERRAL    6. Folate deficiency  - cyanocobalamin (VITAMIN B12) 1000 MCG/ML injection; Inject 0.5 mLs (500 mcg) into the muscle every 30 days Administer 2 weeks after 1000 mcg dose  Dispense: 1 mL; Refill: 11  - folic acid (FOLVITE) 1 MG tablet; Take 1 tablet (1,000 mcg) by mouth daily  Dispense: 90 tablet; Refill: 3    7. Allergic urticaria    - cyanocobalamin (VITAMIN B12) 1000 MCG/ML injection; Inject 0.5 mLs (500 mcg) into the muscle every 30 days Administer 2 weeks after 1000 mcg dose  Dispense: 1 mL; Refill: 11  - folic acid (FOLVITE) 1 MG tablet; Take 1 tablet (1,000 mcg) by mouth daily  Dispense: 90 tablet; Refill: 3  - montelukast (SINGULAIR) 10 MG tablet; Take 1 tablet (10 mg) by mouth 2 times daily  Dispense: 180 tablet; Refill: 3  - Beclomethasone Dipropionate (QNASL) 80 MCG/ACT AERS Nasal Spray; Spray 2 sprays into both nostrils daily  Dispense: 8.7 g; Refill: 3    8. Sjogren's syndrome with myopathy (H)  Encouraged to establish with a rheumatologist in [place].  - cyanocobalamin (VITAMIN B12) 1000 MCG/ML injection; Inject 0.5 mLs (500 mcg) into the muscle every 30 days Administer 2 weeks after 1000 mcg dose  Dispense: 1 mL; Refill: 11  - folic acid (FOLVITE) 1 MG tablet; Take 1 tablet (1,000 mcg) by mouth daily  Dispense: 90 tablet; Refill: 3    9. Neuropathy  Worsening neuropathy symptoms towards the end of the month when is due for B12 injection.  Plan to increase vitamin B12 2000 at the beginning of the month and 500 mcg midmonth.  Goal is to decrease neuropathy symptoms towards the end of the month.  - cyanocobalamin (VITAMIN B12) 1000 MCG/ML injection; Inject 0.5 mLs (500 mcg) into the muscle every 30  days Administer 2 weeks after 1000 mcg dose  Dispense: 1 mL; Refill: 11  - folic acid (FOLVITE) 1 MG tablet; Take 1 tablet (1,000 mcg) by mouth daily  Dispense: 90 tablet; Refill: 3    10. Chronic seasonal allergic rhinitis due to pollen    - cyanocobalamin (VITAMIN B12) 1000 MCG/ML injection; Inject 0.5 mLs (500 mcg) into the muscle every 30 days Administer 2 weeks after 1000 mcg dose  Dispense: 1 mL; Refill: 11  - folic acid (FOLVITE) 1 MG tablet; Take 1 tablet (1,000 mcg) by mouth daily  Dispense: 90 tablet; Refill: 3        REX R[facility]

## 2017-12-19 LAB
T4 FREE SERPL-MCNC: 1.06 NG/DL (ref 0.76–1.46)
TSH SERPL DL<=0.005 MIU/L-ACNC: 6.28 MU/L (ref 0.4–4)

## 2017-12-20 ENCOUNTER — OFFICE VISIT (OUTPATIENT)
Dept: DERMATOLOGY | Facility: CLINIC | Age: 62
End: 2017-12-20
Payer: COMMERCIAL

## 2017-12-20 VITALS — DIASTOLIC BLOOD PRESSURE: 78 MMHG | SYSTOLIC BLOOD PRESSURE: 127 MMHG | HEART RATE: 77 BPM | OXYGEN SATURATION: 96 %

## 2017-12-20 DIAGNOSIS — E53.8 FOLATE DEFICIENCY: ICD-10-CM

## 2017-12-20 DIAGNOSIS — L50.0 ALLERGIC URTICARIA: ICD-10-CM

## 2017-12-20 DIAGNOSIS — L82.1 SK (SEBORRHEIC KERATOSIS): Primary | ICD-10-CM

## 2017-12-20 DIAGNOSIS — E66.09 NON MORBID OBESITY DUE TO EXCESS CALORIES: ICD-10-CM

## 2017-12-20 DIAGNOSIS — E53.8 VITAMIN B12 DEFICIENCY (NON ANEMIC): ICD-10-CM

## 2017-12-20 DIAGNOSIS — M35.00 SJOGREN'S SYNDROME (H): ICD-10-CM

## 2017-12-20 DIAGNOSIS — D18.00 ANGIOMA: ICD-10-CM

## 2017-12-20 DIAGNOSIS — I10 HYPERTENSION GOAL BP (BLOOD PRESSURE) < 140/90: ICD-10-CM

## 2017-12-20 DIAGNOSIS — E03.4 HYPOTHYROIDISM DUE TO ACQUIRED ATROPHY OF THYROID: ICD-10-CM

## 2017-12-20 DIAGNOSIS — G62.9 NEUROPATHY: ICD-10-CM

## 2017-12-20 DIAGNOSIS — J30.1 SEASONAL ALLERGIC RHINITIS DUE TO POLLEN: ICD-10-CM

## 2017-12-20 DIAGNOSIS — D48.5 NEOPLASM OF UNCERTAIN BEHAVIOR OF SKIN: ICD-10-CM

## 2017-12-20 DIAGNOSIS — J30.1 CHRONIC SEASONAL ALLERGIC RHINITIS DUE TO POLLEN: ICD-10-CM

## 2017-12-20 DIAGNOSIS — M35.03 SJOGREN'S SYNDROME WITH MYOPATHY (H): ICD-10-CM

## 2017-12-20 DIAGNOSIS — E78.5 HYPERLIPIDEMIA LDL GOAL <130: ICD-10-CM

## 2017-12-20 DIAGNOSIS — L81.4 LENTIGO: ICD-10-CM

## 2017-12-20 DIAGNOSIS — Z80.8 FAMILY HISTORY OF MELANOMA: ICD-10-CM

## 2017-12-20 PROCEDURE — 99203 OFFICE O/P NEW LOW 30 MIN: CPT | Mod: 25 | Performed by: DERMATOLOGY

## 2017-12-20 PROCEDURE — 88305 TISSUE EXAM BY PATHOLOGIST: CPT | Performed by: DERMATOLOGY

## 2017-12-20 PROCEDURE — 11100 HC BIOPSY SKIN/SUBQ/MUC MEM, SINGLE LESION: CPT | Performed by: DERMATOLOGY

## 2017-12-20 RX ORDER — FOLIC ACID 1 MG/1
TABLET ORAL
Qty: 90 TABLET | Refills: 1 | Status: SHIPPED | OUTPATIENT
Start: 2017-12-20 | End: 2019-01-21

## 2017-12-20 RX ORDER — LEVOTHYROXINE SODIUM 175 MCG
TABLET ORAL
Qty: 90 TABLET | Refills: 1 | Status: CANCELLED | OUTPATIENT
Start: 2017-12-20

## 2017-12-20 RX ORDER — LEVOTHYROXINE SODIUM 200 UG/1
200 TABLET ORAL DAILY
Qty: 90 TABLET | Refills: 0 | Status: SHIPPED | OUTPATIENT
Start: 2017-12-20 | End: 2018-03-28

## 2017-12-20 NOTE — LETTER
12/20/2017         RE: Leanna Griffin  [id number] [facility] [id number]        Dear Colleague,    Thank you for referring your patient, Leanna Griffin, to the [facility]. Please see a copy of my visit note below.    Leanna Griffin is a 62 year old year old female patient here today for spot on chest.   .  Patient states this has been present for months since summer.  Patient reports the following symptoms:  New and changing.  Dad had melanoma .  Patient reports the following previous treatments none.  Patient reports the following modifying factors none.  Associated symptoms: none.  Patient has no other skin complaints today.  Remainder of the HPI, Meds, PMH, Allergies, FH, and SH was reviewed in chart.    Pertinent Hx:   FMHX of melanoma   Past Medical History:   Diagnosis Date     Abnormal maternal glucose tolerance, complicating pregnancy, childbirth, or the puerperium, unspecified as to episode of care 1974    Gestational diabetes     Gouty arthropathy 4/20/1996    Single exacerbation without recurrence     Hyperlipidemia LDL goal <130 10/31/2010    Neuropathy symptoms.  Stop the statins for 8 weeks and recheck.  If pain in feet/legs goes away, we need to find an alternative for treatment.  If no change in neuropathy, restart.       Hypertension goal BP (blood pressure) < 140/90 4/20/2006    Last exam: February 19, 2007 BPs: 162/92 - 140/86 Meds: intollerant of Hctz 25 started (February 19, 2007) Labs:Obtained February 19, 2007 BPs in April, July and here have been high, and BP at [facility] also high. Htn survey negative except for thyroid symptoms.  Also taking OCP and recommend stopping     HYPOTHYROIDISM NOS 11/23/2004    TSH    10.06   12/12/2014 .  Increase to 137. TSH     8.56   2/9/2015 - increase to 150.        Lyme disease 11/23/2004     Vitamin B12 deficiency (non anemic) 9/11/2014    Supplement with oral b12 and recheck in 12 weeks.         Past Surgical History:   Procedure  Laterality Date     HC CLOSED TX METACARPAL FX W EXT FIXATION, W MANIP, EACH Left 2005    Left hand. Related to injury     HC REMOVAL OF TONSILS,<11 Y/O          Family History   Problem Relation Age of Onset     C.A.D. Father      Melanoma Father      Breast Cancer Mother      DIABETES Paternal Aunt      CANCER Other      ovarian in mother, prostate in father; melanoma in father     Lipids Other      Thyroid Disease Other      mother, father       Social History     Social History     Marital status:      Spouse name: Yasmany     Number of children: 1     Years of education: 14     Occupational History     [employer]      Pay roll     Social History Main Topics     Smoking status: Never Smoker     Smokeless tobacco: Never Used     Alcohol use 0.0 oz/week     0 Standard drinks or equivalent per week      Comment: one drink/month     Drug use: No     Sexual activity: Yes     Partners: Male     Other Topics Concern     Parent/Sibling W/ Cabg, Mi Or Angioplasty Before 65f 55m? No     Social History Narrative       Outpatient Encounter Prescriptions as of 12/20/2017   Medication Sig Dispense Refill     levothyroxine (SYNTHROID/LEVOTHROID) 200 MCG tablet Take 1 tablet (200 mcg) by mouth daily 90 tablet 0     simvastatin (ZOCOR) 40 MG tablet Take 1 tablet (40 mg) by mouth At Bedtime 90 tablet 2     cyanocobalamin (VITAMIN B12) 1000 MCG/ML injection Inject 0.5 mLs (500 mcg) into the muscle every 30 days Administer 2 weeks after 1000 mcg dose 1 mL 11     folic acid (FOLVITE) 1 MG tablet Take 1 tablet (1,000 mcg) by mouth daily 90 tablet 3     montelukast (SINGULAIR) 10 MG tablet Take 1 tablet (10 mg) by mouth 2 times daily 180 tablet 3     Beclomethasone Dipropionate (QNASL) 80 MCG/ACT AERS Nasal Spray Spray 2 sprays into both nostrils daily 8.7 g 3     cholecalciferol (VITAMIN D3) 5000 UNITS CAPS capsule Take 5,000 Units by mouth daily       Multiple Vitamins-Minerals (WOMENS MULTI VITAMIN & MINERAL PO) Take  by mouth daily       calcium 500 MG CHEW Take 500 mg by mouth daily       [DISCONTINUED] levothyroxine (SYNTHROID/LEVOTHROID) 175 MCG tablet Take 1 tablet (175 mcg) by mouth daily 90 tablet 1     Facility-Administered Encounter Medications as of 12/20/2017   Medication Dose Route Frequency Provider Last Rate Last Dose     sodium chloride (PF) 0.9% PF flush 3 mL  3 mL Intracatheter Q8H Danitza Childress MD   20 mL at 09/02/15 1651             Review Of Systems  Skin: As above  Eyes: negative  Ears/Nose/Throat: negative  Respiratory: No shortness of breath, dyspnea on exertion, cough, or hemoptysis  Cardiovascular: negative  Gastrointestinal: negative  Genitourinary: negative  Musculoskeletal: negative  Neurologic: negative  Psychiatric: negative  Hematologic/Lymphatic/Immunologic: negative  Endocrine: negative      O:   NAD, WDWN, Alert & Oriented, Mood & Affect wnl, Vitals stable   Here today alone   /78  Pulse 77  LMP 06/23/2006  SpO2 96%   General appearance normal   Vitals stable   Alert, oriented and in no acute distress     Mid chest irregularly pigmented 7mm black macule  Stuck on papules and brown macules on trunk and ext    Red papules on trunk       The remainder of expanded problem focused exam was unremarkable; the following areas were examined:  scalp/hair, conjunctiva/lids, face, neck, lips, back , chest, digits/nails, RUE, LUE.      Eyes: Conjunctivae/lids:Normal     ENT: Lips, buccal mucosa, tongue: normal    MSK:Normal    Cardiovascular: peripheral edema none    Pulm: Breathing Normal    Lymph Nodes: No Head and Neck Lymphadenopathy     Neuro/Psych: Orientation:Normal; Mood/Affect:Normal      A/P:  1. Chest r/o melanoma  TANGENTIAL BIOPSY SENT OUT:  After consent, anesthesia with LEC and prep, tangential excision performed and specimen sent out for permanent section histology.  No complications and routine wound care. Patient told to call our office in 1-2 weeks for result.      2.  Seborrheic keratosis, letnigo, angioma  BENIGN LESIONS DISCUSSED WITH PATIENT:  I discussed the specifics of tumor, prognosis, and genetics of benign lesions.  I explained that treatment of these lesions would be purely cosmetic and not medically neccessary.  I discussed with patient different removal options including excision, cautery and /or laser.      Nature and genetics of benign skin lesions dicussed with patient.  Signs and Symptoms of skin cancer discussed with patient.  Patient encouraged to perform monthly skin exams.  UV precautions reviewed with patient.  Skin care regimen reviewed with patient: Eliminate harsh soaps, i.e. Dial, zest, irsih spring; Mild soaps such as Cetaphil or Dove sensitive skin, avoid hot or cold showers, aggressive use of emollients including vanicream, cetaphil or cerave discussed with patient.    Risks of non-melanoma skin cancer discussed with patient   Return to clinic pending bx      Again, thank you for allowing me to participate in the care of your patient.        Sincerely,        Garfield Valdes MD

## 2017-12-20 NOTE — NURSING NOTE
"Initial /78  Pulse 77  LMP 06/23/2006  SpO2 96% Estimated body mass index is 43.73 kg/(m^2) as calculated from the following:    Height as of 6/12/17: 1.613 m (5' 3.5\").    Weight as of 12/18/17: 113.8 kg (250 lb 12.8 oz). .      "

## 2017-12-20 NOTE — PROGRESS NOTES
Leanna Griffin is a 62 year old year old female patient here today for spot on chest.   .  Patient states this has been present for months since summer.  Patient reports the following symptoms:  New and changing.  Dad had melanoma .  Patient reports the following previous treatments none.  Patient reports the following modifying factors none.  Associated symptoms: none.  Patient has no other skin complaints today.  Remainder of the HPI, Meds, PMH, Allergies, FH, and SH was reviewed in chart.    Pertinent Hx:   FMHX of melanoma   Past Medical History:   Diagnosis Date     Abnormal maternal glucose tolerance, complicating pregnancy, childbirth, or the puerperium, unspecified as to episode of care 1974    Gestational diabetes     Gouty arthropathy 4/20/1996    Single exacerbation without recurrence     Hyperlipidemia LDL goal <130 10/31/2010    Neuropathy symptoms.  Stop the statins for 8 weeks and recheck.  If pain in feet/legs goes away, we need to find an alternative for treatment.  If no change in neuropathy, restart.       Hypertension goal BP (blood pressure) < 140/90 4/20/2006    Last exam: February 19, 2007 BPs: 162/92 - 140/86 Meds: intollerant of Hctz 25 started (February 19, 2007) Labs:Obtained February 19, 2007 BPs in April, July and here have been high, and BP at [facility] also high. Htn survey negative except for thyroid symptoms.  Also taking OCP and recommend stopping     HYPOTHYROIDISM NOS 11/23/2004    TSH    10.06   12/12/2014 .  Increase to 137. TSH     8.56   2/9/2015 - increase to 150.        Lyme disease 11/23/2004     Vitamin B12 deficiency (non anemic) 9/11/2014    Supplement with oral b12 and recheck in 12 weeks.         Past Surgical History:   Procedure Laterality Date     HC CLOSED TX METACARPAL FX W EXT FIXATION, W MANIP, EACH Left 2005    Left hand. Related to injury     HC REMOVAL OF TONSILS,<13 Y/O          Family History   Problem Relation Age of Onset     C.A.D. Father      Melanoma  Father      Breast Cancer Mother      DIABETES Paternal Aunt      CANCER Other      ovarian in mother, prostate in father; melanoma in father     Lipids Other      Thyroid Disease Other      mother, father       Social History     Social History     Marital status:      Spouse name: Yasmany     Number of children: 1     Years of education: 14     Occupational History     [employer]      Pay roll     Social History Main Topics     Smoking status: Never Smoker     Smokeless tobacco: Never Used     Alcohol use 0.0 oz/week     0 Standard drinks or equivalent per week      Comment: one drink/month     Drug use: No     Sexual activity: Yes     Partners: Male     Other Topics Concern     Parent/Sibling W/ Cabg, Mi Or Angioplasty Before 65f 55m? No     Social History Narrative       Outpatient Encounter Prescriptions as of 12/20/2017   Medication Sig Dispense Refill     levothyroxine (SYNTHROID/LEVOTHROID) 200 MCG tablet Take 1 tablet (200 mcg) by mouth daily 90 tablet 0     simvastatin (ZOCOR) 40 MG tablet Take 1 tablet (40 mg) by mouth At Bedtime 90 tablet 2     cyanocobalamin (VITAMIN B12) 1000 MCG/ML injection Inject 0.5 mLs (500 mcg) into the muscle every 30 days Administer 2 weeks after 1000 mcg dose 1 mL 11     folic acid (FOLVITE) 1 MG tablet Take 1 tablet (1,000 mcg) by mouth daily 90 tablet 3     montelukast (SINGULAIR) 10 MG tablet Take 1 tablet (10 mg) by mouth 2 times daily 180 tablet 3     Beclomethasone Dipropionate (QNASL) 80 MCG/ACT AERS Nasal Spray Spray 2 sprays into both nostrils daily 8.7 g 3     cholecalciferol (VITAMIN D3) 5000 UNITS CAPS capsule Take 5,000 Units by mouth daily       Multiple Vitamins-Minerals (WOMENS MULTI VITAMIN & MINERAL PO) Take by mouth daily       calcium 500 MG CHEW Take 500 mg by mouth daily       [DISCONTINUED] levothyroxine (SYNTHROID/LEVOTHROID) 175 MCG tablet Take 1 tablet (175 mcg) by mouth daily 90 tablet 1     Facility-Administered Encounter Medications as  of 12/20/2017   Medication Dose Route Frequency Provider Last Rate Last Dose     sodium chloride (PF) 0.9% PF flush 3 mL  3 mL Intracatheter Q8H Danitza Childress MD   20 mL at 09/02/15 1651             Review Of Systems  Skin: As above  Eyes: negative  Ears/Nose/Throat: negative  Respiratory: No shortness of breath, dyspnea on exertion, cough, or hemoptysis  Cardiovascular: negative  Gastrointestinal: negative  Genitourinary: negative  Musculoskeletal: negative  Neurologic: negative  Psychiatric: negative  Hematologic/Lymphatic/Immunologic: negative  Endocrine: negative      O:   NAD, WDWN, Alert & Oriented, Mood & Affect wnl, Vitals stable   Here today alone   /78  Pulse 77  LMP 06/23/2006  SpO2 96%   General appearance normal   Vitals stable   Alert, oriented and in no acute distress     Mid chest irregularly pigmented 7mm black macule  Stuck on papules and brown macules on trunk and ext    Red papules on trunk       The remainder of expanded problem focused exam was unremarkable; the following areas were examined:  scalp/hair, conjunctiva/lids, face, neck, lips, back , chest, digits/nails, RUE, LUE.      Eyes: Conjunctivae/lids:Normal     ENT: Lips, buccal mucosa, tongue: normal    MSK:Normal    Cardiovascular: peripheral edema none    Pulm: Breathing Normal    Lymph Nodes: No Head and Neck Lymphadenopathy     Neuro/Psych: Orientation:Normal; Mood/Affect:Normal      A/P:  1. Chest r/o melanoma  TANGENTIAL BIOPSY SENT OUT:  After consent, anesthesia with LEC and prep, tangential excision performed and specimen sent out for permanent section histology.  No complications and routine wound care. Patient told to call our office in 1-2 weeks for result.      2. Seborrheic keratosis, letnigo, angioma  BENIGN LESIONS DISCUSSED WITH PATIENT:  I discussed the specifics of tumor, prognosis, and genetics of benign lesions.  I explained that treatment of these lesions would be purely cosmetic and not medically  neccessary.  I discussed with patient different removal options including excision, cautery and /or laser.      Nature and genetics of benign skin lesions dicussed with patient.  Signs and Symptoms of skin cancer discussed with patient.  Patient encouraged to perform monthly skin exams.  UV precautions reviewed with patient.  Skin care regimen reviewed with patient: Eliminate harsh soaps, i.e. Dial, zest, irsih spring; Mild soaps such as Cetaphil or Dove sensitive skin, avoid hot or cold showers, aggressive use of emollients including vanicream, cetaphil or cerave discussed with patient.    Risks of non-melanoma skin cancer discussed with patient   Return to clinic pending bx

## 2017-12-20 NOTE — TELEPHONE ENCOUNTER
Prescription approved per [facility].  Miryam Carrillo RN

## 2017-12-20 NOTE — PATIENT INSTRUCTIONS
Wound Care Instructions     FOR SUPERFICIAL WOUNDS     [facility] [phone]    [facility] [phone]                       AFTER 24 HOURS YOU SHOULD REMOVE THE BANDAGE AND BEGIN DAILY DRESSING CHANGES AS FOLLOWS:     1) Remove Dressing.     2) Clean and dry the area with tap water using a Q-tip or sterile gauze pad.     3) Apply Vaseline, Aquaphor, Polysporin ointment or Bacitracin ointment over entire wound.  Do NOT use Neosporin ointment.     4) Cover the wound with a band-aid, or a sterile non-stick gauze pad and micropore paper tape      REPEAT THESE INSTRUCTIONS AT LEAST ONCE A DAY UNTIL THE WOUND HAS COMPLETELY HEALED.    It is an old wives tale that a wound heals better when it is exposed to air and allowed to dry out. The wound will heal faster with a better cosmetic result if it is kept moist with ointment and covered with a bandage.    **Do not let the wound dry out.**      Supplies Needed:      *Cotton tipped applicators (Q-tips)    *Polysporin Ointment or Bacitracin Ointment (NOT NEOSPORIN)    *Band-aids or non-stick gauze pads and micropore paper tape.      PATIENT INFORMATION:    During the healing process you will notice a number of changes. All wounds develop a small halo of redness surrounding the wound.  This means healing is occurring. Severe itching with extensive redness usually indicates sensitivity to the ointment or bandage tape used to dress the wound.  You should call our office if this develops.      Swelling  and/or discoloration around your surgical site is common, particularly when performed around the eye.    All wounds normally drain.  The larger the wound the more drainage there will be.  After 7-10 days, you will notice the wound beginning to shrink and new skin will begin to grow.  The wound is healed when you can see skin has formed over the entire area.  A healed wound has a healthy, shiny look to the surface and is red to dark pink in color  to normalize.  Wounds may take approximately 4-6 weeks to heal.  Larger wounds may take 6-8 weeks.  After the wound is healed you may discontinue dressing changes.    You may experience a sensation of tightness as your wound heals. This is normal and will gradually subside.    Your healed wound may be sensitive to temperature changes. This sensitivity improves with time, but if you re having a lot of discomfort, try to avoid temperature extremes.    Patients frequently experience itching after their wound appears to have healed because of the continue healing under the skin.  Plain Vaseline will help relieve the itching.        POSSIBLE COMPLICATIONS    BLEEDIN. Leave the bandage in place.  2. Use tightly rolled up gauze or a cloth to apply direct pressure over the bandage for 30  minutes.  3. Reapply pressure for an additional 30 minutes if necessary  4. Use additional gauze and tape to maintain pressure once the bleeding has stopped.

## 2017-12-20 NOTE — MR AVS SNAPSHOT
After Visit Summary   12/20/2017    Leanna Griffin    MRN: [id number]           Patient Information     Date Of Birth          1955        Visit Information        Provider Department      12/20/2017 11:30 AM Garfield Valdes MD [facility]        Today's Diagnoses     SK (seborrheic keratosis)    -  1    Lentigo        Angioma        Neoplasm of uncertain behavior of skin        Family history of melanoma          Care Instructions          Wound Care Instructions     FOR SUPERFICIAL WOUNDS     [facility] [phone]    [facility] [phone]                       AFTER 24 HOURS YOU SHOULD REMOVE THE BANDAGE AND BEGIN DAILY DRESSING CHANGES AS FOLLOWS:     1) Remove Dressing.     2) Clean and dry the area with tap water using a Q-tip or sterile gauze pad.     3) Apply Vaseline, Aquaphor, Polysporin ointment or Bacitracin ointment over entire wound.  Do NOT use Neosporin ointment.     4) Cover the wound with a band-aid, or a sterile non-stick gauze pad and micropore paper tape      REPEAT THESE INSTRUCTIONS AT LEAST ONCE A DAY UNTIL THE WOUND HAS COMPLETELY HEALED.    It is an old wives tale that a wound heals better when it is exposed to air and allowed to dry out. The wound will heal faster with a better cosmetic result if it is kept moist with ointment and covered with a bandage.    **Do not let the wound dry out.**      Supplies Needed:      *Cotton tipped applicators (Q-tips)    *Polysporin Ointment or Bacitracin Ointment (NOT NEOSPORIN)    *Band-aids or non-stick gauze pads and micropore paper tape.      PATIENT INFORMATION:    During the healing process you will notice a number of changes. All wounds develop a small halo of redness surrounding the wound.  This means healing is occurring. Severe itching with extensive redness usually indicates sensitivity to the ointment or bandage tape used to dress the wound.  You should call our office if  this develops.      Swelling  and/or discoloration around your surgical site is common, particularly when performed around the eye.    All wounds normally drain.  The larger the wound the more drainage there will be.  After 7-10 days, you will notice the wound beginning to shrink and new skin will begin to grow.  The wound is healed when you can see skin has formed over the entire area.  A healed wound has a healthy, shiny look to the surface and is red to dark pink in color to normalize.  Wounds may take approximately 4-6 weeks to heal.  Larger wounds may take 6-8 weeks.  After the wound is healed you may discontinue dressing changes.    You may experience a sensation of tightness as your wound heals. This is normal and will gradually subside.    Your healed wound may be sensitive to temperature changes. This sensitivity improves with time, but if you re having a lot of discomfort, try to avoid temperature extremes.    Patients frequently experience itching after their wound appears to have healed because of the continue healing under the skin.  Plain Vaseline will help relieve the itching.        POSSIBLE COMPLICATIONS    BLEEDIN. Leave the bandage in place.  2. Use tightly rolled up gauze or a cloth to apply direct pressure over the bandage for 30  minutes.  3. Reapply pressure for an additional 30 minutes if necessary  4. Use additional gauze and tape to maintain pressure once the bleeding has stopped.            Follow-ups after your visit        Your next 10 appointments already scheduled     Dec 29, 2017  4:30 PM CST   [facility] with [facility]    [address] [address]   [phone]              Future tests that were ordered for you today     Open Future Orders        Priority Expected Expires Ordered    TSH with free T4 reflex Routine  2018            Who to contact     If you have questions or  need follow up information about today's clinic visit or your schedule please contact [facility] directly at [phone].  Normal or non-critical lab and imaging results will be communicated to you by [employer], letter or phone within 4 business days after the clinic has received the results. If you do not hear from us within 7 days, please contact the clinic through [employer] or phone. If you have a critical or abnormal lab result, we will notify you by phone as soon as possible.  Submit refill requests through "[employer] or call your pharmacy and they will forward the refill request to us. Please allow 3 business days for your refill to be completed.          Additional Information About Your Visit        [employer] Information     "[employer] gives you secure access to your electronic health record. If you see a primary care provider, you can also send messages to your care team and make appointments. If you have questions, please call your primary care clinic.  If you do not have a primary care provider, please call [phone] and they will assist you.        Care EveryWhere ID     This is your Care EveryWhere ID. This could be used by other organizations to access your [facility] medical records  [id number]        Your Vitals Were     Pulse Last Period Pulse Oximetry             77 06/23/2006 96%          Blood Pressure from Last 3 Encounters:   12/20/17 127/78   12/18/17 120/68   06/12/17 137/84    Weight from Last 3 Encounters:   12/18/17 113.8 kg (250 lb 12.8 oz)   06/12/17 114.1 kg (251 lb 9.6 oz)   05/27/16 102.3 kg (225 lb 9.6 oz)              We Performed the Following     BIOPSY SKIN/SUBQ/MUC MEM, SINGLE LESION     Surgical pathology exam          Today's Medication Changes          These changes are accurate as of: 12/20/17 11:45 AM.  If you have any questions, ask your nurse or doctor.               These medicines have changed or have updated prescriptions.        Dose/Directions    levothyroxine  200 MCG tablet   Commonly known as:  SYNTHROID/LEVOTHROID   This may have changed:    - medication strength  - how much to take   Used for:  Hypothyroidism due to acquired atrophy of thyroid, Folate deficiency, Vitamin B12 deficiency (non anemic), Allergic urticaria, Non morbid obesity due to excess calories, Hypertension goal BP (blood pressure) < 140/90, Hyperlipidemia LDL goal <130, Sjogren's syndrome with myopathy (H), Neuropathy, Chronic seasonal allergic rhinitis due to pollen   Changed by:  Tahira Maxwell APRN CNP        Dose:  200 mcg   Take 1 tablet (200 mcg) by mouth daily   Quantity:  90 tablet   Refills:  0            Where to get your medicines      Some of these will need a paper prescription and others can be bought over the counter.  Ask your nurse if you have questions.     Bring a paper prescription for each of these medications     levothyroxine 200 MCG tablet                Primary Care Provider Office Phone # Fax #    Zackery Hope [phone] [address] [place] [address]        Equal Access to Services     [facility] Hospital[facility] AH: Hadii jessie ku hadasho Soomaali, waaxda luqadaha, qaybta kaalmada adeegyada, waxay nelliin hayalee morris . So [facility] [phone].    ATENCIÓN: Si habla español, tiene a nobles disposición servicios gratuitos de asistencia lingüística. [facility] [phone].    We comply with applicable federal civil rights laws and [place] laws. We do not discriminate on the basis of race, color, national origin, age, disability, sex, sexual orientation, or gender identity.            Thank you!     Thank you for choosing [facility]  for your care. Our goal is always to provide you with excellent care. Hearing back from our patients is one way we can continue to improve our services. Please take a few minutes to complete the written survey that you may receive in the mail after your visit with us. Thank you!              Your Updated Medication List - Protect others around you: Learn how to safely use, store and throw away your medicines at www.disposemymeds.org.          This list is accurate as of: 12/20/17 11:45 AM.  Always use your most recent med list.                   Brand Name Dispense Instructions for use Diagnosis    Beclomethasone Dipropionate 80 MCG/ACT Aers Nasal Spray    QNASL    8.7 g    Spray 2 sprays into both nostrils daily    Allergic urticaria       calcium 500 MG Chew      Take 500 mg by mouth daily        cholecalciferol 5000 UNITS Caps capsule    vitamin D3     Take 5,000 Units by mouth daily        cyanocobalamin 1000 MCG/ML injection    VITAMIN B12    1 mL    Inject 0.5 mLs (500 mcg) into the muscle every 30 days Administer 2 weeks after 1000 mcg dose    Vitamin B12 deficiency (non anemic), Folate deficiency, Allergic urticaria, Hypothyroidism due to acquired atrophy of thyroid, Hypertension goal BP (blood pressure) < 140/90, Hyperlipidemia LDL goal <130, Sjogren's syndrome with myopathy (H), Neuropathy, Chronic seasonal allergic rhinitis due to pollen       folic acid 1 MG tablet    FOLVITE    90 tablet    Take 1 tablet (1,000 mcg) by mouth daily    Folate deficiency, Vitamin B12 deficiency (non anemic), Allergic urticaria, Hypothyroidism due to acquired atrophy of thyroid, Hypertension goal BP (blood pressure) < 140/90, Hyperlipidemia LDL goal <130, Sjogren's syndrome with myopathy (H), Neuropathy, Chronic seasonal allergic rhinitis due to pollen       levothyroxine 200 MCG tablet    SYNTHROID/LEVOTHROID    90 tablet    Take 1 tablet (200 mcg) by mouth daily    Hypothyroidism due to acquired atrophy of thyroid, Folate deficiency, Vitamin B12 deficiency (non anemic), Allergic urticaria, Non morbid obesity due to excess calories, Hypertension goal BP (blood pressure) < 140/90, Hyperlipidemia LDL goal <130, Sjogren's syndrome with myopathy (H), Neuropathy, Chronic seasonal allergic rhinitis due to pollen        montelukast 10 MG tablet    SINGULAIR    180 tablet    Take 1 tablet (10 mg) by mouth 2 times daily    Allergic urticaria       simvastatin 40 MG tablet    ZOCOR    90 tablet    Take 1 tablet (40 mg) by mouth At Bedtime    Hyperlipidemia LDL goal <130       WOMENS MULTI VITAMIN & MINERAL PO      Take by mouth daily

## 2017-12-28 LAB — COPATH REPORT: NORMAL

## 2018-01-03 ENCOUNTER — OFFICE VISIT (OUTPATIENT)
Dept: DERMATOLOGY | Facility: CLINIC | Age: 63
End: 2018-01-03
Payer: COMMERCIAL

## 2018-01-03 VITALS — DIASTOLIC BLOOD PRESSURE: 71 MMHG | HEART RATE: 91 BPM | SYSTOLIC BLOOD PRESSURE: 107 MMHG | OXYGEN SATURATION: 95 %

## 2018-01-03 DIAGNOSIS — C43.59 MALIGNANT MELANOMA OF CHEST WALL (H): Primary | ICD-10-CM

## 2018-01-03 PROCEDURE — 17313 MOHS 1 STAGE T/A/L: CPT | Performed by: DERMATOLOGY

## 2018-01-03 PROCEDURE — 13101 CMPLX RPR TRUNK 2.6-7.5 CM: CPT | Mod: 51 | Performed by: DERMATOLOGY

## 2018-01-03 NOTE — LETTER
1/3/2018         RE: Leanna Griffin  [address] [address]        Dear Colleague,    Thank you for referring your patient, Leanna Griffin, to the [facility]. Please see a copy of my visit note below.    Leanna Griffin is a 62 year old year old female patient here today for evaluation and managment of 0.35mm melanoma chest.  Patient has no other skin complaints today.  Remainder of the HPI, Meds, PMH, Allergies, FH, and SH was reviewed in chart.      Past Medical History:   Diagnosis Date     Abnormal maternal glucose tolerance, complicating pregnancy, childbirth, or the puerperium, unspecified as to episode of care 1974    Gestational diabetes     Gouty arthropathy 4/20/1996    Single exacerbation without recurrence     Hyperlipidemia LDL goal <130 10/31/2010    Neuropathy symptoms.  Stop the statins for 8 weeks and recheck.  If pain in feet/legs goes away, we need to find an alternative for treatment.  If no change in neuropathy, restart.       Hypertension goal BP (blood pressure) < 140/90 4/20/2006    Last exam: February 19, 2007 BPs: 162/92 - 140/86 Meds: intollerant of Hctz 25 started (February 19, 2007) Labs:Obtained February 19, 2007 BPs in April, July and here have been high, and BP at [facility] also high. Htn survey negative except for thyroid symptoms.  Also taking OCP and recommend stopping     HYPOTHYROIDISM NOS 11/23/2004    TSH    10.06   12/12/2014 .  Increase to 137. TSH     8.56   2/9/2015 - increase to 150.        Lyme disease 11/23/2004     Malignant melanoma (H)      Vitamin B12 deficiency (non anemic) 9/11/2014    Supplement with oral b12 and recheck in 12 weeks.         Past Surgical History:   Procedure Laterality Date     HC CLOSED TX METACARPAL FX W EXT FIXATION, W MANIP, EACH Left 2005    Left hand. Related to injury     HC REMOVAL OF TONSILS,<13 Y/O          Family History   Problem Relation Age of Onset     C.A.D. Father      Melanoma Father      Breast  Cancer Mother      DIABETES Paternal Aunt      CANCER Other      ovarian in mother, prostate in father; melanoma in father     Lipids Other      Thyroid Disease Other      mother, father       Social History     Social History     Marital status:      Spouse name: Yasmany     Number of children: 1     Years of education: 14     Occupational History     [employer]      Pay roll     Social History Main Topics     Smoking status: Never Smoker     Smokeless tobacco: Never Used     Alcohol use 0.0 oz/week     0 Standard drinks or equivalent per week      Comment: one drink/month     Drug use: No     Sexual activity: Yes     Partners: Male     Other Topics Concern     Parent/Sibling W/ Cabg, Mi Or Angioplasty Before 65f 55m? No     Social History Narrative       Outpatient Encounter Prescriptions as of 1/3/2018   Medication Sig Dispense Refill     folic acid (FOLVITE) 1 MG tablet TAKE ONE TABLET BY MOUTH EVERY DAY 90 tablet 1     levothyroxine (SYNTHROID/LEVOTHROID) 200 MCG tablet Take 1 tablet (200 mcg) by mouth daily 90 tablet 0     simvastatin (ZOCOR) 40 MG tablet Take 1 tablet (40 mg) by mouth At Bedtime 90 tablet 2     cyanocobalamin (VITAMIN B12) 1000 MCG/ML injection Inject 0.5 mLs (500 mcg) into the muscle every 30 days Administer 2 weeks after 1000 mcg dose 1 mL 11     folic acid (FOLVITE) 1 MG tablet Take 1 tablet (1,000 mcg) by mouth daily 90 tablet 3     montelukast (SINGULAIR) 10 MG tablet Take 1 tablet (10 mg) by mouth 2 times daily 180 tablet 3     Beclomethasone Dipropionate (QNASL) 80 MCG/ACT AERS Nasal Spray Spray 2 sprays into both nostrils daily 8.7 g 3     cholecalciferol (VITAMIN D3) 5000 UNITS CAPS capsule Take 5,000 Units by mouth daily       Multiple Vitamins-Minerals (WOMENS MULTI VITAMIN & MINERAL PO) Take by mouth daily       calcium 500 MG CHEW Take 500 mg by mouth daily       Facility-Administered Encounter Medications as of 1/3/2018   Medication Dose Route Frequency Provider  Last Rate Last Dose     sodium chloride (PF) 0.9% PF flush 3 mL  3 mL Intracatheter Q8H Danitza Childress MD   20 mL at 09/02/15 1651             Review Of Systems  Skin: As above  Eyes: negative  Ears/Nose/Throat: negative  Respiratory: No shortness of breath, dyspnea on exertion, cough, or hemoptysis  Cardiovascular: negative  Gastrointestinal: negative  Genitourinary: negative  Musculoskeletal: negative  Neurologic: negative  Psychiatric: negative  Hematologic/Lymphatic/Immunologic: negative  Endocrine: negative      O:   NAD, WDWN, Alert & Oriented, Mood & Affect wnl, Vitals stable   Here today alone   /71  Pulse 91  LMP 06/23/2006  SpO2 95%   General appearance normal   Vitals stable   Alert, oriented and in no acute distress      Following lymph nodes palpated: Occipital, Cervical, Supraclavicular , axillary no lad   Mid chest 1cm red plaque      Eyes: Conjunctivae/lids:Normal     ENT: Lips, buccal mucosa, tongue: normal    MSK:Normal    Cardiovascular: peripheral edema none    Pulm: Breathing Normal    Lymph Nodes: No Head and Neck Lymphadenopathy     Neuro/Psych: Orientation:Normal; Mood/Affect:Normal      A/P:  1. Mid chest 0.34mm melanoma  MOHS MART-1:  I discussed the specifics of tumor, prognosis, metachronous melanoma, self exam, and genetics with the patient. I explained the need for monthly skin exams including palpating lymph nodes, and taught the patient how to do this. Patient was asked about new or changing moles and a full skin exam was performed. I reviewed treatment options, including a discussion of wide excision (the gold standard) versus Mohs surgery with MART-1 immunostains.     Note: MART-1 (Melanoma Antigen Recognized by T-cells) antibody immunostaining was used during Mohs surgery as per standard protocol, in addition to routine processing of all specimens with hematoxylin and eosin. The peripheral margins/edges were processed with the MART-1 stain (4 specimens total). The  center was examined with hematoxylin & eosin and MART-1 immunostains. The patient was informed of the procedure and its risk/benefits during the consent for the procedure.    One or more of the reagents used in immunohistochemical testing in this case may not have been cleared or approved by the U.S. Food and Drug Administration (FDA). The FDA has determined that such clearance or approval is not necessary. These tests are used for clinical purposes. They should not be regarded as investigational or for research. These reagents  performance characteristics have been determined by [facility]. This laboratory is certified under the Clinical Laboratory Improvement Amendments of 1988 (CLIA-88) as qualified to perform high complexity clinical laboratory testing.    After [facility] discussed with patient, decision for Mohs surgery was made. Indication for Mohs was aggressive histology. Patient confirmed the site with Dr. Valdes. After anesthesia with LEC, the tumor was excised using standard Mohs technique in 1 stages(s).  MART 1 stains were performed on 4 specimens. CLEAR MARGINS OBTAINED and Final defect size was 1.9 cm.   REPAIR COMPLEX: Because of the tightness of the surrounding skin and Because of the size and full thickness nature of the defect, a complex closure was planned. After LEC anesthesia and prep, Burow's triangles were excised in the relaxed skin tension lines. The wound edges were widely undermined by dissection in the subcutaneous plane until adequate tissue mobility was obtained. Hemostasis was obtained. The wound edges were closed in a layered fashion using Vicryl and Fast Absorbing Plain Gut sutures. Postoperative length was 5.4 cm.   EBL minimal; complications none; wound care routine.  The patient was discharged in good condition and will return in one week for wound evaluation.    BENIGN LESIONS DISCUSSED WITH PATIENT:  I discussed the specifics of tumor, prognosis, and genetics of  benign lesions.  I explained that treatment of these lesions would be purely cosmetic and not medically neccessary.  I discussed with patient different removal options including excision, cautery and /or laser.      Nature and genetics of benign skin lesions dicussed with patient.  Signs and Symptoms of skin cancer discussed with patient.  Patient encouraged to perform monthly skin exams.  UV precautions reviewed with patient.  Skin care regimen reviewed with patient: Eliminate harsh soaps, i.e. Dial, zest, irsih spring; Mild soaps such as Cetaphil or Dove sensitive skin, avoid hot or cold showers, aggressive use of emollients including vanicream, cetaphil or cerave discussed with patient.    Risks of non-melanoma skin cancer discussed with patient   Return to clinic 3 months    CASTLE testing discussed with patient and sent out today           Again, thank you for allowing me to participate in the care of your patient.        Sincerely,        Garfield Valdes MD

## 2018-01-03 NOTE — MR AVS SNAPSHOT
After Visit Summary   1/3/2018    Leanna Griffin    MRN: [id number]           Patient Information     Date Of Birth          1955        Visit Information        Provider Department      1/3/2018 7:30 AM Garfield Valdes MD [facility]        Today's Diagnoses     Malignant melanoma of chest wall (H)    -  1      Care Instructions      Sutured Wound Care     [facility]: [phone]    [facility]: [phone]  Chest (1st week)          ? No strenuous activity for 48 hours. Resume moderate activity in 48 hours. No heavy exercising until you are seen for follow up in one week.     ? Take Tylenol as needed for discomfort.                         ? Do not drink alcoholic beverages for 48 hours.     ? Keep the pressure bandage in place for 24 hours. If the bandage becomes blood tinged or loose, reinforce it with gauze and tape.        (Refer to the reverse side of this page for management of bleeding).    ? Remove pressure bandage in 24 hours     ? Leave the flat bandage in place until your follow up appointment.    ? Keep the bandage dry. Wash around it carefully.    ? If the tape becomes soiled or starts to come off, reinforce it with additional paper tape.    ? Do not smoke for 3 weeks; smoking is detrimental to wound healing.    ? It is normal to have swelling and bruising around the surgical site. The bruising will fade in approximately 10-14 days. Elevate the area to reduce swelling.    ? Numbness, itchiness and sensitivity to temperature changes can occur after surgery and may take up to 18 months to normalize.      POSSIBLE COMPLICATIONS    BLEEDIN. Leave the bandage in place.  2. Use tightly rolled up gauze or a cloth to apply direct pressure over the bandage for 20   minutes.  3. Reapply pressure for an additional 20 minutes if necessary  4. Call the office or go to the nearest emergency room if pressure fails to stop the bleeding.  5. Use  additional gauze and tape to maintain pressure once the bleeding has stopped.        PAIN:    1. Post operative pain should slowly get better, never worse.  2. A severe increase in pain may indicate a problem. Call the office if this occurs.    In case of emergency phone:Dr Valdes [phone]      Change bandage on January 10th. Take off current bandage, clean incision site with water. Dry area. Re-apply new steri-strips, tape and the tegaderm (waterproof bandage). Keep this bandage on until January 17th.       WOUND CARE INSTRUCTIONS  for  ONE WEEK AFTER SURGERY          1) Leave flat bandage on your skin for one week after today s bandage change.  2) In one week when you remove the bandage, you may resume your regular skin care routine, including washing with mild soap and water, applying moisturizer, make-up and sunscreen.    3) If there are any open or bleeding areas at the incision/graft site you should begin to cover the area with a bandage daily as follows:    1) Clean and dry the area with plain tap water using a Q-tip or sterile gauze pad.  2) Apply Polysporin or Bacitracin ointment to the open area.  3) Cover the wound with a band-aid or a sterile non-stick gauze pad and micropore paper tape.             *Once the bandages are removed, the scar will be red and firm (especially in the lip/chin area). This is normal and will fade in time. It might take 6-12 months for this to happen.     *Massaging the area will help the scar soften and fade quicker. Begin to massage the area one month after the bandages have been removed. To massage apply pressure directly and firmly over the scar with the fingertips and move in a circular motion. Massage the area for a few minutes several times a day. Continue to massage the site for several months.    *Approximately 6-8 weeks after surgery it is not uncommon to see the formation of  tender pimple-like  bump along the scar. This is normal. As the scar continues to mature  and the stitches underneath the skin begin to dissolve, this might occur. Do not pick or squeeze, this will resolve on it s own. Should one break open producing a small amount of drainage, apply Polysporin or Bacitracin ointment a few times a day until the wound is completely healed.    *Numbness in the surgical area is expected. It might take 12-18 months for the feeling to return to normal. During this time sensations of itchiness, tingling and occasional sharp pains might be noted. These feelings are normal and will subside once the nerves have completely healed.         IN CASE OF EMERGENCY: Dr Valdes [phone]     If you were seen in [place] call: [phone]    If you were seen in [place] call: [phone]            Follow-ups after your visit        Who to contact     If you have questions or need follow up information about today's clinic visit or your schedule please contact [facility] directly at [phone].  Normal or non-critical lab and imaging results will be communicated to you by VertiFlexhart, letter or phone within 4 business days after the clinic has received the results. If you do not hear from us within 7 days, please contact the clinic through JournallyMet or phone. If you have a critical or abnormal lab result, we will notify you by phone as soon as possible.  Submit refill requests through worldhistoryproject or call your pharmacy and they will forward the refill request to us. Please allow 3 business days for your refill to be completed.          Additional Information About Your Visit        worldhistoryproject Information     worldhistoryproject gives you secure access to your electronic health record. If you see a primary care provider, you can also send messages to your care team and make appointments. If you have questions, please call your primary care clinic.  If you do not have a primary care provider, please call [phone] and they will assist you.        Care EveryWhere ID     This is your Care  EveryWhere ID. This could be used by other organizations to access your [facility] medical records  [id number]        Your Vitals Were     Pulse Last Period Pulse Oximetry             91 06/23/2006 95%          Blood Pressure from Last 3 Encounters:   01/03/18 107/71   12/20/17 127/78   12/18/17 120/68    Weight from Last 3 Encounters:   12/18/17 113.8 kg (250 lb 12.8 oz)   06/12/17 114.1 kg (251 lb 9.6 oz)   05/27/16 102.3 kg (225 lb 9.6 oz)              We Performed the Following     CL IMMUNOHISTOCHEMICAL STAIN      MOHS TRUNK/ARM/LEG 1ST STAGE UP T0 5 BLOCKS     REPAIR COMPLEX, WOUND TRUNK 2.6-7.5 CM        Primary Care Provider Fax #    Physician No Ref-Primary [phone]       No address on file        Equal Access to Services     DIANE BLACKBURN : Ross Puga, wachristina taylor, nemesio robinalnoah tinajero, brook morris . So [facility] [phone].    ATENCIÓN: Si habla español, tiene a nobles disposición servicios gratuitos de asistencia lingüística. Llame al [phone].    We comply with applicable federal civil rights laws and [place] laws. We do not discriminate on the basis of race, color, national origin, age, disability, sex, sexual orientation, or gender identity.            Thank you!     Thank you for choosing [facility]  for your care. Our goal is always to provide you with excellent care. Hearing back from our patients is one way we can continue to improve our services. Please take a few minutes to complete the written survey that you may receive in the mail after your visit with us. Thank you!             Your Updated Medication List - Protect others around you: Learn how to safely use, store and throw away your medicines at www.disposemymeds.org.          This list is accurate as of: 1/3/18 10:50 AM.  Always use your most recent med list.                   Brand Name Dispense Instructions for use Diagnosis    Beclomethasone Dipropionate 80  MCG/ACT Aers Nasal Spray    QNASL    8.7 g    Spray 2 sprays into both nostrils daily    Allergic urticaria       calcium 500 MG Chew      Take 500 mg by mouth daily        cholecalciferol 5000 UNITS Caps capsule    vitamin D3     Take 5,000 Units by mouth daily        cyanocobalamin 1000 MCG/ML injection    VITAMIN B12    1 mL    Inject 0.5 mLs (500 mcg) into the muscle every 30 days Administer 2 weeks after 1000 mcg dose    Vitamin B12 deficiency (non anemic), Folate deficiency, Allergic urticaria, Hypothyroidism due to acquired atrophy of thyroid, Hypertension goal BP (blood pressure) < 140/90, Hyperlipidemia LDL goal <130, Sjogren's syndrome with myopathy (H), Neuropathy, Chronic seasonal allergic rhinitis due to pollen       * folic acid 1 MG tablet    FOLVITE    90 tablet    Take 1 tablet (1,000 mcg) by mouth daily    Folate deficiency, Vitamin B12 deficiency (non anemic), Allergic urticaria, Hypothyroidism due to acquired atrophy of thyroid, Hypertension goal BP (blood pressure) < 140/90, Hyperlipidemia LDL goal <130, Sjogren's syndrome with myopathy (H), Neuropathy, Chronic seasonal allergic rhinitis due to pollen       * folic acid 1 MG tablet    FOLVITE    90 tablet    TAKE ONE TABLET BY MOUTH EVERY DAY    Folate deficiency, Vitamin B12 deficiency (non anemic), Allergic urticaria, Hypothyroidism due to acquired atrophy of thyroid, Non morbid obesity due to excess calories, Hypertension goal BP (blood pressure) < 140/90, Hyperlipidemia LDL goal <130, Sjogren's syndrome (H), Neuropathy, Seasonal allergic rhinitis due to pollen       levothyroxine 200 MCG tablet    SYNTHROID/LEVOTHROID    90 tablet    Take 1 tablet (200 mcg) by mouth daily    Hypothyroidism due to acquired atrophy of thyroid, Folate deficiency, Vitamin B12 deficiency (non anemic), Allergic urticaria, Non morbid obesity due to excess calories, Hypertension goal BP (blood pressure) < 140/90, Hyperlipidemia LDL goal <130, Sjogren's syndrome with  myopathy (H), Neuropathy, Chronic seasonal allergic rhinitis due to pollen       montelukast 10 MG tablet    SINGULAIR    180 tablet    Take 1 tablet (10 mg) by mouth 2 times daily    Allergic urticaria       simvastatin 40 MG tablet    ZOCOR    90 tablet    Take 1 tablet (40 mg) by mouth At Bedtime    Hyperlipidemia LDL goal <130       WOMENS MULTI VITAMIN & MINERAL PO      Take by mouth daily        * Notice:  This list has 2 medication(s) that are the same as other medications prescribed for you. Read the directions carefully, and ask your doctor or other care provider to review them with you.

## 2018-01-03 NOTE — PATIENT INSTRUCTIONS
Sutured Wound Care     [facility]: [phone]    [facility]: [phone]  Chest (1st week)          ? No strenuous activity for 48 hours. Resume moderate activity in 48 hours. No heavy exercising until you are seen for follow up in one week.     ? Take Tylenol as needed for discomfort.                         ? Do not drink alcoholic beverages for 48 hours.     ? Keep the pressure bandage in place for 24 hours. If the bandage becomes blood tinged or loose, reinforce it with gauze and tape.        (Refer to the reverse side of this page for management of bleeding).    ? Remove pressure bandage in 24 hours     ? Leave the flat bandage in place until your follow up appointment.    ? Keep the bandage dry. Wash around it carefully.    ? If the tape becomes soiled or starts to come off, reinforce it with additional paper tape.    ? Do not smoke for 3 weeks; smoking is detrimental to wound healing.    ? It is normal to have swelling and bruising around the surgical site. The bruising will fade in approximately 10-14 days. Elevate the area to reduce swelling.    ? Numbness, itchiness and sensitivity to temperature changes can occur after surgery and may take up to 18 months to normalize.      POSSIBLE COMPLICATIONS    BLEEDIN. Leave the bandage in place.  2. Use tightly rolled up gauze or a cloth to apply direct pressure over the bandage for 20   minutes.  3. Reapply pressure for an additional 20 minutes if necessary  4. Call the office or go to the nearest emergency room if pressure fails to stop the bleeding.  5. Use additional gauze and tape to maintain pressure once the bleeding has stopped.        PAIN:    1. Post operative pain should slowly get better, never worse.  2. A severe increase in pain may indicate a problem. Call the office if this occurs.    In case of emergency phone:Dr Valdes [phone]      Change bandage on . Take off current bandage, clean incision site  with water. Dry area. Re-apply new steri-strips, tape and the tegaderm (waterproof bandage). Keep this bandage on until January 17th.       WOUND CARE INSTRUCTIONS  for  ONE WEEK AFTER SURGERY          1) Leave flat bandage on your skin for one week after today s bandage change.  2) In one week when you remove the bandage, you may resume your regular skin care routine, including washing with mild soap and water, applying moisturizer, make-up and sunscreen.    3) If there are any open or bleeding areas at the incision/graft site you should begin to cover the area with a bandage daily as follows:    1) Clean and dry the area with plain tap water using a Q-tip or sterile gauze pad.  2) Apply Polysporin or Bacitracin ointment to the open area.  3) Cover the wound with a band-aid or a sterile non-stick gauze pad and micropore paper tape.             *Once the bandages are removed, the scar will be red and firm (especially in the lip/chin area). This is normal and will fade in time. It might take 6-12 months for this to happen.     *Massaging the area will help the scar soften and fade quicker. Begin to massage the area one month after the bandages have been removed. To massage apply pressure directly and firmly over the scar with the fingertips and move in a circular motion. Massage the area for a few minutes several times a day. Continue to massage the site for several months.    *Approximately 6-8 weeks after surgery it is not uncommon to see the formation of  tender pimple-like  bump along the scar. This is normal. As the scar continues to mature and the stitches underneath the skin begin to dissolve, this might occur. Do not pick or squeeze, this will resolve on it s own. Should one break open producing a small amount of drainage, apply Polysporin or Bacitracin ointment a few times a day until the wound is completely healed.    *Numbness in the surgical area is expected. It might take 12-18 months for the feeling to  return to normal. During this time sensations of itchiness, tingling and occasional sharp pains might be noted. These feelings are normal and will subside once the nerves have completely healed.         IN CASE OF EMERGENCY: Dr Valdes [phone]     If you were seen in [place] call: [phone]    If you were seen in [place] call: [phone]

## 2018-01-03 NOTE — NURSING NOTE
"Initial /71  Pulse 91  LMP 06/23/2006  SpO2 95% Estimated body mass index is 43.73 kg/(m^2) as calculated from the following:    Height as of 6/12/17: 1.613 m (5' 3.5\").    Weight as of 12/18/17: 113.8 kg (250 lb 12.8 oz). .      "

## 2018-01-03 NOTE — PROGRESS NOTES
Leanna Griffin is a 62 year old year old female patient here today for evaluation and managment of 0.35mm melanoma chest.  Patient has no other skin complaints today.  Remainder of the HPI, Meds, PMH, Allergies, FH, and SH was reviewed in chart.      Past Medical History:   Diagnosis Date     Abnormal maternal glucose tolerance, complicating pregnancy, childbirth, or the puerperium, unspecified as to episode of care 1974    Gestational diabetes     Gouty arthropathy 4/20/1996    Single exacerbation without recurrence     Hyperlipidemia LDL goal <130 10/31/2010    Neuropathy symptoms.  Stop the statins for 8 weeks and recheck.  If pain in feet/legs goes away, we need to find an alternative for treatment.  If no change in neuropathy, restart.       Hypertension goal BP (blood pressure) < 140/90 4/20/2006    Last exam: February 19, 2007 BPs: 162/92 - 140/86 Meds: intollerant of Hctz 25 started (February 19, 2007) Labs:Obtained February 19, 2007 BPs in April, July and here have been high, and BP at [facility] also high. Htn survey negative except for thyroid symptoms.  Also taking OCP and recommend stopping     HYPOTHYROIDISM NOS 11/23/2004    TSH    10.06   12/12/2014 .  Increase to 137. TSH     8.56   2/9/2015 - increase to 150.        Lyme disease 11/23/2004     Malignant melanoma (H)      Vitamin B12 deficiency (non anemic) 9/11/2014    Supplement with oral b12 and recheck in 12 weeks.         Past Surgical History:   Procedure Laterality Date     HC CLOSED TX METACARPAL FX W EXT FIXATION, W MANIP, EACH Left 2005    Left hand. Related to injury     HC REMOVAL OF TONSILS,<11 Y/O          Family History   Problem Relation Age of Onset     C.A.D. Father      Melanoma Father      Breast Cancer Mother      DIABETES Paternal Aunt      CANCER Other      ovarian in mother, prostate in father; melanoma in father     Lipids Other      Thyroid Disease Other      mother, father       Social History     Social History     Marital  status:      Spouse name: Yasmany     Number of children: 1     Years of education: 14     Occupational History     [employer]      Pay roll     Social History Main Topics     Smoking status: Never Smoker     Smokeless tobacco: Never Used     Alcohol use 0.0 oz/week     0 Standard drinks or equivalent per week      Comment: one drink/month     Drug use: No     Sexual activity: Yes     Partners: Male     Other Topics Concern     Parent/Sibling W/ Cabg, Mi Or Angioplasty Before 65f 55m? No     Social History Narrative       Outpatient Encounter Prescriptions as of 1/3/2018   Medication Sig Dispense Refill     folic acid (FOLVITE) 1 MG tablet TAKE ONE TABLET BY MOUTH EVERY DAY 90 tablet 1     levothyroxine (SYNTHROID/LEVOTHROID) 200 MCG tablet Take 1 tablet (200 mcg) by mouth daily 90 tablet 0     simvastatin (ZOCOR) 40 MG tablet Take 1 tablet (40 mg) by mouth At Bedtime 90 tablet 2     cyanocobalamin (VITAMIN B12) 1000 MCG/ML injection Inject 0.5 mLs (500 mcg) into the muscle every 30 days Administer 2 weeks after 1000 mcg dose 1 mL 11     folic acid (FOLVITE) 1 MG tablet Take 1 tablet (1,000 mcg) by mouth daily 90 tablet 3     montelukast (SINGULAIR) 10 MG tablet Take 1 tablet (10 mg) by mouth 2 times daily 180 tablet 3     Beclomethasone Dipropionate (QNASL) 80 MCG/ACT AERS Nasal Spray Spray 2 sprays into both nostrils daily 8.7 g 3     cholecalciferol (VITAMIN D3) 5000 UNITS CAPS capsule Take 5,000 Units by mouth daily       Multiple Vitamins-Minerals (WOMENS MULTI VITAMIN & MINERAL PO) Take by mouth daily       calcium 500 MG CHEW Take 500 mg by mouth daily       Facility-Administered Encounter Medications as of 1/3/2018   Medication Dose Route Frequency Provider Last Rate Last Dose     sodium chloride (PF) 0.9% PF flush 3 mL  3 mL Intracatheter Q8H Danitza Childress MD   20 mL at 09/02/15 1651             Review Of Systems  Skin: As above  Eyes: negative  Ears/Nose/Throat: negative  Respiratory: No  shortness of breath, dyspnea on exertion, cough, or hemoptysis  Cardiovascular: negative  Gastrointestinal: negative  Genitourinary: negative  Musculoskeletal: negative  Neurologic: negative  Psychiatric: negative  Hematologic/Lymphatic/Immunologic: negative  Endocrine: negative      O:   NAD, WDWN, Alert & Oriented, Mood & Affect wnl, Vitals stable   Here today alone   /71  Pulse 91  LMP 06/23/2006  SpO2 95%   General appearance normal   Vitals stable   Alert, oriented and in no acute distress      Following lymph nodes palpated: Occipital, Cervical, Supraclavicular , axillary no lad   Mid chest 1cm red plaque      Eyes: Conjunctivae/lids:Normal     ENT: Lips, buccal mucosa, tongue: normal    MSK:Normal    Cardiovascular: peripheral edema none    Pulm: Breathing Normal    Lymph Nodes: No Head and Neck Lymphadenopathy     Neuro/Psych: Orientation:Normal; Mood/Affect:Normal      A/P:  1. Mid chest 0.34mm melanoma  MOHS MART-1:  I discussed the specifics of tumor, prognosis, metachronous melanoma, self exam, and genetics with the patient. I explained the need for monthly skin exams including palpating lymph nodes, and taught the patient how to do this. Patient was asked about new or changing moles and a full skin exam was performed. I reviewed treatment options, including a discussion of wide excision (the gold standard) versus Mohs surgery with MART-1 immunostains.     Note: MART-1 (Melanoma Antigen Recognized by T-cells) antibody immunostaining was used during Mohs surgery as per standard protocol, in addition to routine processing of all specimens with hematoxylin and eosin. The peripheral margins/edges were processed with the MART-1 stain (4 specimens total). The center was examined with hematoxylin & eosin and MART-1 immunostains. The patient was informed of the procedure and its risk/benefits during the consent for the procedure.    One or more of the reagents used in immunohistochemical testing in this  case may not have been cleared or approved by the U.S. Food and Drug Administration (FDA). The FDA has determined that such clearance or approval is not necessary. These tests are used for clinical purposes. They should not be regarded as investigational or for research. These reagents  performance characteristics have been determined by [facility]. This laboratory is certified under the Clinical Laboratory Improvement Amendments of 1988 (CLIA-88) as qualified to perform high complexity clinical laboratory testing.    After [facility] discussed with patient, decision for Mohs surgery was made. Indication for Mohs was aggressive histology. Patient confirmed the site with Dr. Valdes. After anesthesia with LEC, the tumor was excised using standard Mohs technique in 1 stages(s).  MART 1 stains were performed on 4 specimens. CLEAR MARGINS OBTAINED and Final defect size was 1.9 cm.   REPAIR COMPLEX: Because of the tightness of the surrounding skin and Because of the size and full thickness nature of the defect, a complex closure was planned. After LEC anesthesia and prep, Burow's triangles were excised in the relaxed skin tension lines. The wound edges were widely undermined by dissection in the subcutaneous plane until adequate tissue mobility was obtained. Hemostasis was obtained. The wound edges were closed in a layered fashion using Vicryl and Fast Absorbing Plain Gut sutures. Postoperative length was 5.4 cm.   EBL minimal; complications none; wound care routine.  The patient was discharged in good condition and will return in one week for wound evaluation.    BENIGN LESIONS DISCUSSED WITH PATIENT:  I discussed the specifics of tumor, prognosis, and genetics of benign lesions.  I explained that treatment of these lesions would be purely cosmetic and not medically neccessary.  I discussed with patient different removal options including excision, cautery and /or laser.      Nature and genetics of benign  skin lesions dicussed with patient.  Signs and Symptoms of skin cancer discussed with patient.  Patient encouraged to perform monthly skin exams.  UV precautions reviewed with patient.  Skin care regimen reviewed with patient: Eliminate harsh soaps, i.e. Dial, zest, irsih spring; Mild soaps such as Cetaphil or Dove sensitive skin, avoid hot or cold showers, aggressive use of emollients including vanicream, cetaphil or cerave discussed with patient.    Risks of non-melanoma skin cancer discussed with patient   Return to clinic 3 months    CASTLE testing discussed with patient and sent out today

## 2018-01-16 LAB — LAB SCANNED RESULT: NORMAL

## 2018-01-30 ENCOUNTER — TELEPHONE (OUTPATIENT)
Dept: DERMATOLOGY | Facility: CLINIC | Age: 63
End: 2018-01-30

## 2018-01-30 NOTE — LETTER
[facility]  [address] [address]  Phone: [phone]    January 30, 2018    Leanna Griffin                                                                                                                         [address] [address] [address]            Dear Ms. Griffin,    Your melanoma test showed that your melanoma was a low risk Class 1 score in which    the 5-year metastatic free survival rate was determined to be 96%.     Thank you,      Garfield Valdes MD/ [facility]

## 2018-01-30 NOTE — TELEPHONE ENCOUNTER
Garfield Valdes MD  [facility] Pool                     Your melanoma test showed that your melanoma was a low risk Class 1 score in which the 5-year metastatic free survival rate was determined to be 96%.

## 2018-02-12 ENCOUNTER — TELEPHONE (OUTPATIENT)
Dept: FAMILY MEDICINE | Facility: CLINIC | Age: 63
End: 2018-02-12

## 2018-02-12 DIAGNOSIS — Z12.11 SPECIAL SCREENING FOR MALIGNANT NEOPLASMS, COLON: Primary | ICD-10-CM

## 2018-02-12 NOTE — LETTER
February 12, 2018      Leanna Griffin  [address] [place] [facility] [address]        Dear Leanna,     As part of [facility]'s commitment to health and wellness, we periodically look at how well we are taking care of you when you're not sick. Along with your annual physical exams in our office, routine cancer screening is an important early detection tool that we can use together to keep you healthy for a long time.    Our most recent records indicate that you are due for one or more of the following:    Colonoscopy    If you are not contacted by us to schedule your colonoscopy, you can call us to schedule at [phone] at your convenience.    While we work hard to maintain accurate records on all our patients, it is always possible that this notice does not accurately reflect a surgery you have had in the past to remove your colon, uterus (hysterectomy) or breast tissue (mastectomy). If we have made this error in your case please accept our apologies. To ensure that we do not continue to send you notices please verify at your next visit that we have accurate dates and locations of your surgical history, even if these were done many years ago.     Again, working together for your health is our goal. If you have any questions or concerns regarding this letter, we'd like to hear from you.    Thank you for choosing [facility] for your healthcare needs.    Sincerely,     Idania Jama

## 2018-02-13 ENCOUNTER — MYC MEDICAL ADVICE (OUTPATIENT)
Dept: DERMATOLOGY | Facility: CLINIC | Age: 63
End: 2018-02-13

## 2018-02-21 NOTE — TELEPHONE ENCOUNTER
Have not heard from patient and unsure if My chart message has been read or not. Maricruz Paiz RN

## 2018-03-28 DIAGNOSIS — J30.1 CHRONIC SEASONAL ALLERGIC RHINITIS DUE TO POLLEN: ICD-10-CM

## 2018-03-28 DIAGNOSIS — E53.8 FOLATE DEFICIENCY: ICD-10-CM

## 2018-03-28 DIAGNOSIS — E78.5 HYPERLIPIDEMIA LDL GOAL <130: ICD-10-CM

## 2018-03-28 DIAGNOSIS — E53.8 VITAMIN B12 DEFICIENCY (NON ANEMIC): ICD-10-CM

## 2018-03-28 DIAGNOSIS — E66.09 NON MORBID OBESITY DUE TO EXCESS CALORIES: ICD-10-CM

## 2018-03-28 DIAGNOSIS — I10 HYPERTENSION GOAL BP (BLOOD PRESSURE) < 140/90: ICD-10-CM

## 2018-03-28 DIAGNOSIS — E03.4 HYPOTHYROIDISM DUE TO ACQUIRED ATROPHY OF THYROID: ICD-10-CM

## 2018-03-28 DIAGNOSIS — G62.9 NEUROPATHY: ICD-10-CM

## 2018-03-28 DIAGNOSIS — M35.03 SJOGREN'S SYNDROME WITH MYOPATHY (H): ICD-10-CM

## 2018-03-28 DIAGNOSIS — L50.0 ALLERGIC URTICARIA: ICD-10-CM

## 2018-03-28 RX ORDER — LEVOTHYROXINE SODIUM 200 UG/1
TABLET ORAL
Qty: 30 TABLET | Refills: 0 | Status: SHIPPED | OUTPATIENT
Start: 2018-03-28 | End: 2018-04-26

## 2018-03-28 NOTE — TELEPHONE ENCOUNTER
Medication is being filled for 1 time refill only due to:   Over due for office visit and/or labs   ERNIE Richter  RN/Gamaliel Jama

## 2018-03-28 NOTE — TELEPHONE ENCOUNTER
"Requested Prescriptions   Pending Prescriptions Disp Refills     levothyroxine (SYNTHROID/LEVOTHROID) 200 MCG tablet [Pharmacy Med Name: LEVOTHYROXINE 200 MCG TABLET] 90 tablet 0    Last Written Prescription Date:  12/20/17  Last Fill Quantity: 90,  # refills: 0   Last office visit: 12/18/2017 with prescribing provider:  12/18/17   Future Office Visit:     Sig: TAKE 1 TABLET BY MOUTH DAILY    Thyroid Protocol Failed    3/28/2018 10:12 AM       Failed - Normal TSH on file in past 12 months    Recent Labs   Lab Test  12/18/17   1734   TSH  6.28*             Passed - Patient is 12 years or older       Passed - Recent (12 mo) or future (30 days) visit within the authorizing provider's specialty    Patient had office visit in the last 12 months or has a visit in the next 30 days with authorizing provider or within the authorizing provider's specialty.  See \"Patient Info\" tab in inbasket, or \"Choose Columns\" in Meds & Orders section of the refill encounter.           Passed - No active pregnancy on record    If patient is pregnant or has had a positive pregnancy test, please check TSH.         Passed - No positive pregnancy test in past 12 months    If patient is pregnant or has had a positive pregnancy test, please check TSH.            "

## 2018-04-04 NOTE — TELEPHONE ENCOUNTER
Panel Management Review      Patient has the following on her problem list:       IVD   ASA: FAILED    Last LDL:    Lab Results   Component Value Date    CHOL 239 06/12/2017     Lab Results   Component Value Date    HDL 38 06/12/2017     Lab Results   Component Value Date     06/12/2017     Lab Results   Component Value Date    TRIG 269 06/12/2017        Lab Results   Component Value Date    CHOLHDLRATIO 5.5 06/02/2015        Is the patient on a Statin? YES   Is the patient on Aspirin? NO                  Medications     HMG CoA Reductase Inhibitors    simvastatin (ZOCOR) 40 MG tablet          Last three blood pressure readings:  BP Readings from Last 3 Encounters:   01/03/18 107/71   12/20/17 127/78   12/18/17 120/68        Tobacco History:     History   Smoking Status     Never Smoker   Smokeless Tobacco     Never Used       Hypertension   Last three blood pressure readings:  BP Readings from Last 3 Encounters:   01/03/18 107/71   12/20/17 127/78   12/18/17 120/68     Blood pressure: Passed    HTN Guidelines:  Age 18-59 BP range:  Less than 140/90  Age 60-85 with Diabetes:  Less than 140/90  Age 60-85 without Diabetes:  less than 150/90      Composite cancer screening  Chart review shows that this patient is due/due soon for the following Colonoscopy  Summary:    Patient is due/failing the following:   COLONOSCOPY    Action needed:   Patient needs referral/order: colonoscopy    Type of outreach:    Sent [employer] message.    Questions for provider review:    None                                                                                                                                    Kathleen Calles CMA       Chart routed to none .           Surgery Off-Premise Visit Arrived

## 2018-05-10 ENCOUNTER — TELEPHONE (OUTPATIENT)
Dept: FAMILY MEDICINE | Facility: CLINIC | Age: 63
End: 2018-05-10

## 2018-05-10 NOTE — TELEPHONE ENCOUNTER
Panel Management Review      Patient has the following on her problem list:       IVD   ASA: FAILED    Last LDL:    Lab Results   Component Value Date    CHOL 239 06/12/2017     Lab Results   Component Value Date    HDL 38 06/12/2017     Lab Results   Component Value Date     06/12/2017     Lab Results   Component Value Date    TRIG 269 06/12/2017        Lab Results   Component Value Date    CHOLHDLRATIO 5.5 06/02/2015        Is the patient on a Statin? YES   Is the patient on Aspirin? NO                  Medications     HMG CoA Reductase Inhibitors    simvastatin (ZOCOR) 40 MG tablet          Last three blood pressure readings:  BP Readings from Last 3 Encounters:   01/03/18 107/71   12/20/17 127/78   12/18/17 120/68        Tobacco History:     History   Smoking Status     Never Smoker   Smokeless Tobacco     Never Used       Hypertension   Last three blood pressure readings:  BP Readings from Last 3 Encounters:   01/03/18 107/71   12/20/17 127/78   12/18/17 120/68     Blood pressure: Passed    HTN Guidelines:  Age 18-59 BP range:  Less than 140/90  Age 60-85 with Diabetes:  Less than 140/90  Age 60-85 without Diabetes:  less than 150/90      Composite cancer screening  Chart review shows that this patient is due/due soon for the following Colonoscopy/Fecal Colorectal (FIT)  Summary:    Patient is due/failing the following:   HIV screening, Advance Directive Planing, COLONOSCOPY/FIT    Action needed:   Patient needs referral/order: colonoscopy/FIT test and needs to schedule either test.    Type of outreach:    Sent [employer] message.    Questions for provider review:    None                                                                                                                                    Kathleen Calles CMA       Chart routed to none .

## 2019-01-02 DIAGNOSIS — E53.8 FOLATE DEFICIENCY: ICD-10-CM

## 2019-01-02 DIAGNOSIS — J30.1 CHRONIC SEASONAL ALLERGIC RHINITIS DUE TO POLLEN: ICD-10-CM

## 2019-01-02 DIAGNOSIS — E78.5 HYPERLIPIDEMIA LDL GOAL <130: ICD-10-CM

## 2019-01-02 DIAGNOSIS — M35.03 SJOGREN'S SYNDROME WITH MYOPATHY (H): ICD-10-CM

## 2019-01-02 DIAGNOSIS — I10 HYPERTENSION GOAL BP (BLOOD PRESSURE) < 140/90: ICD-10-CM

## 2019-01-02 DIAGNOSIS — E53.8 VITAMIN B12 DEFICIENCY (NON ANEMIC): ICD-10-CM

## 2019-01-02 DIAGNOSIS — E03.4 HYPOTHYROIDISM DUE TO ACQUIRED ATROPHY OF THYROID: ICD-10-CM

## 2019-01-02 DIAGNOSIS — G62.9 NEUROPATHY: ICD-10-CM

## 2019-01-02 DIAGNOSIS — L50.0 ALLERGIC URTICARIA: ICD-10-CM

## 2019-01-03 RX ORDER — CYANOCOBALAMIN 1000 UG/ML
INJECTION, SOLUTION INTRAMUSCULAR; SUBCUTANEOUS
Qty: 1 ML | Refills: 0 | OUTPATIENT
Start: 2019-01-03

## 2019-01-03 NOTE — TELEPHONE ENCOUNTER
Received an additional e-refill request from [facility] [place] for Cyanocobalamin     Request refused - Duplicate, approved 01/02/2019 1ml x 0 from 12/30/2018 refill request

## 2019-01-21 DIAGNOSIS — M35.00 SJOGREN'S SYNDROME (H): ICD-10-CM

## 2019-01-21 DIAGNOSIS — E78.5 HYPERLIPIDEMIA LDL GOAL <130: ICD-10-CM

## 2019-01-21 DIAGNOSIS — L50.0 ALLERGIC URTICARIA: ICD-10-CM

## 2019-01-21 DIAGNOSIS — E66.09 NON MORBID OBESITY DUE TO EXCESS CALORIES: ICD-10-CM

## 2019-01-21 DIAGNOSIS — I10 HYPERTENSION GOAL BP (BLOOD PRESSURE) < 140/90: ICD-10-CM

## 2019-01-21 DIAGNOSIS — J30.1 SEASONAL ALLERGIC RHINITIS DUE TO POLLEN: ICD-10-CM

## 2019-01-21 DIAGNOSIS — E53.8 VITAMIN B12 DEFICIENCY (NON ANEMIC): ICD-10-CM

## 2019-01-21 DIAGNOSIS — E03.4 HYPOTHYROIDISM DUE TO ACQUIRED ATROPHY OF THYROID: ICD-10-CM

## 2019-01-21 DIAGNOSIS — E53.8 FOLATE DEFICIENCY: ICD-10-CM

## 2019-01-21 DIAGNOSIS — G62.9 NEUROPATHY: ICD-10-CM

## 2019-01-22 RX ORDER — FOLIC ACID 1 MG/1
1000 TABLET ORAL DAILY
Qty: 30 TABLET | Refills: 0 | Status: SHIPPED | OUTPATIENT
Start: 2019-01-22 | End: 2019-02-26

## 2019-01-22 NOTE — TELEPHONE ENCOUNTER
"Requested Prescriptions   Pending Prescriptions Disp Refills     folic acid (FOLVITE) 1 MG tablet [Pharmacy Med Name: FOLIC ACID 1 MG TABLET] 90 tablet 2    Last Written Prescription Date:  12/02/2017 #90 x 1  Last filled 10/26/2018  Last office visit: 12/18/2017 ANGELINE Maxwell   Future Office Visit: None    Sig: TAKE 1 TABLET BY MOUTH DAILY    Vitamin Supplements (Adult) Protocol Failed - 1/21/2019 12:58 AM       Failed - Recent (12 mo) or future (30 days) visit within the authorizing provider's specialty    Patient had office visit in the last 12 months or has a visit in the next 30 days with authorizing provider or within the authorizing provider's specialty.  See \"Patient Info\" tab in inbasket, or \"Choose Columns\" in Meds & Orders section of the refill encounter.             Passed - High dose Vitamin D not ordered       Passed - Medication is active on med list          "

## 2019-02-04 ENCOUNTER — TELEPHONE (OUTPATIENT)
Dept: LAB | Facility: CLINIC | Age: 64
End: 2019-02-04

## 2019-03-22 DIAGNOSIS — L50.0 ALLERGIC URTICARIA: ICD-10-CM

## 2019-03-22 DIAGNOSIS — M35.03 SJOGREN'S SYNDROME WITH MYOPATHY (H): ICD-10-CM

## 2019-03-22 DIAGNOSIS — E78.5 HYPERLIPIDEMIA LDL GOAL <130: ICD-10-CM

## 2019-03-22 DIAGNOSIS — E53.8 VITAMIN B12 DEFICIENCY (NON ANEMIC): ICD-10-CM

## 2019-03-22 DIAGNOSIS — I10 HYPERTENSION GOAL BP (BLOOD PRESSURE) < 140/90: ICD-10-CM

## 2019-03-22 DIAGNOSIS — E53.8 FOLATE DEFICIENCY: ICD-10-CM

## 2019-03-22 DIAGNOSIS — J30.1 CHRONIC SEASONAL ALLERGIC RHINITIS DUE TO POLLEN: ICD-10-CM

## 2019-03-22 DIAGNOSIS — G62.9 NEUROPATHY: ICD-10-CM

## 2019-03-22 DIAGNOSIS — E03.4 HYPOTHYROIDISM DUE TO ACQUIRED ATROPHY OF THYROID: ICD-10-CM

## 2019-03-22 NOTE — TELEPHONE ENCOUNTER
"Requested Prescriptions   Pending Prescriptions Disp Refills     folic acid (FOLVITE) 1 MG tablet 90 tablet 3    Last Written Prescription Date:  02/27/2019 #30 x 0  Last filled - not provided  Last office visit: 12/18/2017 ANGELINE Maxwell   Future Office Visit:  None    Note: Requesting a 90 day supply, #90   Sig: Take 1 tablet (1,000 mcg) by mouth daily    Vitamin Supplements (Adult) Protocol Failed - 3/22/2019 10:11 AM       Failed - Recent (12 mo) or future (30 days) visit within the authorizing provider's specialty    Patient had office visit in the last 12 months or has a visit in the next 30 days with authorizing provider or within the authorizing provider's specialty.  See \"Patient Info\" tab in inbasket, or \"Choose Columns\" in Meds & Orders section of the refill encounter.             Passed - High dose Vitamin D not ordered       Passed - Medication is active on med list          "

## 2019-03-25 RX ORDER — FOLIC ACID 1 MG/1
1000 TABLET ORAL DAILY
Qty: 30 TABLET | Refills: 0 | Status: SHIPPED | OUTPATIENT
Start: 2019-03-25

## 2019-06-12 ENCOUNTER — MEDICAL CORRESPONDENCE (OUTPATIENT)
Dept: HEALTH INFORMATION MANAGEMENT | Facility: CLINIC | Age: 64
End: 2019-06-12

## 2019-08-14 ENCOUNTER — OFFICE VISIT (OUTPATIENT)
Dept: DERMATOLOGY | Facility: CLINIC | Age: 64
End: 2019-08-14
Payer: COMMERCIAL

## 2019-08-14 VITALS — SYSTOLIC BLOOD PRESSURE: 134 MMHG | DIASTOLIC BLOOD PRESSURE: 75 MMHG | OXYGEN SATURATION: 98 % | HEART RATE: 82 BPM

## 2019-08-14 DIAGNOSIS — D23.9 DERMAL NEVUS: ICD-10-CM

## 2019-08-14 DIAGNOSIS — D18.01 ANGIOMA OF SKIN: ICD-10-CM

## 2019-08-14 DIAGNOSIS — Z85.820 HISTORY OF MELANOMA: ICD-10-CM

## 2019-08-14 DIAGNOSIS — L82.1 SEBORRHEIC KERATOSIS: ICD-10-CM

## 2019-08-14 DIAGNOSIS — Z80.8 FAMILY HISTORY OF MELANOMA: ICD-10-CM

## 2019-08-14 DIAGNOSIS — L81.4 LENTIGO: Primary | ICD-10-CM

## 2019-08-14 DIAGNOSIS — D22.9 NEVUS: ICD-10-CM

## 2019-08-14 PROCEDURE — 99213 OFFICE O/P EST LOW 20 MIN: CPT | Performed by: DERMATOLOGY

## 2019-08-14 NOTE — LETTER
8/14/2019         RE: Leanna Griffin  [address] [facility] [address]        Dear Colleague,    Thank you for referring your patient, Leanna Griffin, to the [facility]. Please see a copy of my visit note below.    Leanna Griffin is a 64 year old year old female patient here today for hx of melanoma 0.35mm chest wall.  . She note sspot on left leg.   Patient states this has been present for a while.  Patient reports the following symptoms:  white.  Patient reports the following previous treatments none.  Patient reports the following modifying factors none.  Associated symptoms: none.  Patient has no other skin complaints today.  Remainder of the HPI, Meds, PMH, Allergies, FH, and SH was reviewed in chart.    Pertinent Hx:   fmhx of melanoma dad   Past Medical History:   Diagnosis Date     Abnormal maternal glucose tolerance, complicating pregnancy, childbirth, or the puerperium, unspecified as to episode of care 1974    Gestational diabetes     Gouty arthropathy 4/20/1996    Single exacerbation without recurrence     Hyperlipidemia LDL goal <130 10/31/2010    Neuropathy symptoms.  Stop the statins for 8 weeks and recheck.  If pain in feet/legs goes away, we need to find an alternative for treatment.  If no change in neuropathy, restart.       Hypertension goal BP (blood pressure) < 140/90 4/20/2006    Last exam: February 19, 2007 BPs: 162/92 - 140/86 Meds: intollerant of Hctz 25 started (February 19, 2007) Labs:Obtained February 19, 2007 BPs in April, July and here have been high, and BP at [facility] also high. Htn survey negative except for thyroid symptoms.  Also taking OCP and recommend stopping     HYPOTHYROIDISM NOS 11/23/2004    TSH    10.06   12/12/2014 .  Increase to 137. TSH     8.56   2/9/2015 - increase to 150.        Lyme disease 11/23/2004     Malignant melanoma (H)      Vitamin B12 deficiency (non anemic) 9/11/2014    Supplement with oral b12 and recheck in 12 weeks.         Past  Surgical History:   Procedure Laterality Date     HC CLOSED TX METACARPAL FX W EXT FIXATION, W MANIP, EACH Left 2005    Left hand. Related to injury     HC REMOVAL OF TONSILS,<13 Y/O          Family History   Problem Relation Age of Onset     C.A.D. Father      Melanoma Father      Breast Cancer Mother      Diabetes Paternal Aunt      Cancer Other         ovarian in mother, prostate in father; melanoma in father     Lipids Other      Thyroid Disease Other         mother, father       Social History     Socioeconomic History     Marital status:      Spouse name: Yasmany     Number of children: 1     Years of education: 14     Highest education level: Not on file   Occupational History     Occupation: [employer]     Comment: Pay roll   Social Needs     Financial resource strain: Not on file     Food insecurity:     Worry: Not on file     Inability: Not on file     Transportation needs:     Medical: Not on file     Non-medical: Not on file   Tobacco Use     Smoking status: Never Smoker     Smokeless tobacco: Never Used   Substance and Sexual Activity     Alcohol use: Yes     Alcohol/week: 0.0 oz     Comment: one drink/month     Drug use: No     Sexual activity: Yes     Partners: Male   Lifestyle     Physical activity:     Days per week: Not on file     Minutes per session: Not on file     Stress: Not on file   Relationships     Social connections:     Talks on phone: Not on file     Gets together: Not on file     Attends Jain service: Not on file     Active member of club or organization: Not on file     Attends meetings of clubs or organizations: Not on file     Relationship status: Not on file     Intimate partner violence:     Fear of current or ex partner: Not on file     Emotionally abused: Not on file     Physically abused: Not on file     Forced sexual activity: Not on file   Other Topics Concern     Parent/sibling w/ CABG, MI or angioplasty before 65F 55M? No   Social History Narrative     Not  on file       Outpatient Encounter Medications as of 8/14/2019   Medication Sig Dispense Refill     Beclomethasone Dipropionate (QNASL) 80 MCG/ACT AERS Nasal Spray Spray 2 sprays into both nostrils daily 8.7 g 3     calcium 500 MG CHEW Take 500 mg by mouth daily       cholecalciferol (VITAMIN D3) 5000 UNITS CAPS capsule Take 5,000 Units by mouth daily       folic acid (FOLVITE) 1 MG tablet Take 1 tablet (1,000 mcg) by mouth daily (Needs follow-up appointment for this medication) 30 tablet 0     folic acid (FOLVITE) 1 MG tablet TAKE 1 TABLET BY MOUTH DAILY (NEEDS FOLLOW-UP APPOINTMENT FOR THIS MEDICATION) 30 tablet 0     levothyroxine (SYNTHROID/LEVOTHROID) 200 MCG tablet TAKE 1 TABLET BY MOUTH DAILY 30 tablet 0     montelukast (SINGULAIR) 10 MG tablet Take 1 tablet (10 mg) by mouth 2 times daily 180 tablet 3     Multiple Vitamins-Minerals (WOMENS MULTI VITAMIN & MINERAL PO) Take by mouth daily       simvastatin (ZOCOR) 40 MG tablet Take 1 tablet (40 mg) by mouth At Bedtime 90 tablet 2     vitamin B-12 (CYANOCOBALAMIN) 1000 MCG/ML injection INJECT 0.5ML INTO THE MUSCLE EVERY 30 DAYS ADMINISTER 2 WEEKS AFTER 1000 MCG DOSE 1 mL 0     Facility-Administered Encounter Medications as of 8/14/2019   Medication Dose Route Frequency Provider Last Rate Last Dose     sodium chloride (PF) 0.9% PF flush 3 mL  3 mL Intracatheter Q8H Danitza Childress MD   20 mL at 09/02/15 1651             Review Of Systems  Skin: As above  Eyes: negative  Ears/Nose/Throat: negative  Respiratory: No shortness of breath, dyspnea on exertion, cough, or hemoptysis  Cardiovascular: negative  Gastrointestinal: negative  Genitourinary: negative  Musculoskeletal: negative  Neurologic: negative  Psychiatric: negative  Hematologic/Lymphatic/Immunologic: negative  Endocrine: negative      O:   NAD, WDWN, Alert & Oriented, Mood & Affect wnl, Vitals stable   Here today alone   LMP 06/23/2006    General appearance normal   Vitals stable   Alert, oriented and  in no acute distress      Following lymph nodes palpated: Occipital, Cervical, Supraclavicularaxilla  no lad   Chest well healed   Pigmented macule son trunk and ext with regula borders and pigment networks    L leg stuck on apule       Stuck on papules and brown macules on trunk and ext   Red papules on trunk  Flesh colored papules on trunk     The remainder of the full exam was unremarkable; the following areas were examined:  conjunctiva/lids, oral mucosa, neck, peripheral vascular system, abdomen, lymph nodes, digits/nails, eccrine and apocrine glands, scalp/hair, face, neck, chest, abdomen, buttocks, back, RUE, LUE, RLE, LLE       Eyes: Conjunctivae/lids:Normal     ENT: Lips, buccal mucosa, tongue: normal    MSK:Normal    Cardiovascular: peripheral edema none    Pulm: Breathing Normal    Lymph Nodes: No Head and Neck Lymphadenopathy     Neuro/Psych: Orientation:Normal; Mood/Affect:Normal    A/P:  1. Seborrheic keratosis, lentigo, angioma, dermal nevus, hx of melnaoma  MELANOMA DISCUSSED WITH PATIENT:  I discussed the specifics of tumor, prognosis, metachronous melanoma, self exam, and genetics with the patient. I explained the need for monthly skin exams including and taught the patient how to do this. Patient was asked about new or changing moles . I discussed with patient signs and symptoms that could arise in the setting of recurrent locoregional or metastatic disease. In addition, the need to undergo every 4 month dermatologic full skin survey and evaluation given that patients with a diagnosis of melanoma are at risk of recurrence (local and distant) and of subsequent de divine melanoma.      BENIGN LESIONS DISCUSSED WITH PATIENT:  I discussed the specifics of tumor, prognosis, and genetics of benign lesions.  I explained that treatment of these lesions would be purely cosmetic and not medically neccessary.  I discussed with patient different removal options including excision, cautery and /or laser.       Nature and genetics of benign skin lesions dicussed with patient.  Signs and Symptoms of skin cancer discussed with patient.  ABCDEs of melanoma reviewed with patient.  Patient encouraged to perform monthly skin exams.  UV precautions reviewed with patient.  Skin care regimen reviewed with patient: Eliminate harsh soaps, i.e. Dial, zest, irsih spring; Mild soaps such as Cetaphil or Dove sensitive skin, avoid hot or cold showers, aggressive use of emollients including vanicream, cetaphil or cerave discussed with patient.    Risks of non-melanoma skin cancer discussed with patient   Return to clinic 4 motnhs    Again, thank you for allowing me to participate in the care of your patient.        Sincerely,        Garfield Valdes MD

## 2019-08-14 NOTE — PROGRESS NOTES
Leanna Griffin is a 64 year old year old female patient here today for hx of melanoma 0.35mm chest wall.  . She note sspot on left leg.   Patient states this has been present for a while.  Patient reports the following symptoms:  white.  Patient reports the following previous treatments none.  Patient reports the following modifying factors none.  Associated symptoms: none.  Patient has no other skin complaints today.  Remainder of the HPI, Meds, PMH, Allergies, FH, and SH was reviewed in chart.    Pertinent Hx:   fmhx of melanoma dad   Past Medical History:   Diagnosis Date     Abnormal maternal glucose tolerance, complicating pregnancy, childbirth, or the puerperium, unspecified as to episode of care 1974    Gestational diabetes     Gouty arthropathy 4/20/1996    Single exacerbation without recurrence     Hyperlipidemia LDL goal <130 10/31/2010    Neuropathy symptoms.  Stop the statins for 8 weeks and recheck.  If pain in feet/legs goes away, we need to find an alternative for treatment.  If no change in neuropathy, restart.       Hypertension goal BP (blood pressure) < 140/90 4/20/2006    Last exam: February 19, 2007 BPs: 162/92 - 140/86 Meds: intollerant of Hctz 25 started (February 19, 2007) Labs:Obtained February 19, 2007 BPs in April, July and here have been high, and BP at [facility] also high. Htn survey negative except for thyroid symptoms.  Also taking OCP and recommend stopping     HYPOTHYROIDISM NOS 11/23/2004    TSH    10.06   12/12/2014 .  Increase to 137. TSH     8.56   2/9/2015 - increase to 150.        Lyme disease 11/23/2004     Malignant melanoma (H)      Vitamin B12 deficiency (non anemic) 9/11/2014    Supplement with oral b12 and recheck in 12 weeks.         Past Surgical History:   Procedure Laterality Date     HC CLOSED TX METACARPAL FX W EXT FIXATION, W MANIP, EACH Left 2005    Left hand. Related to injury     HC REMOVAL OF TONSILS,<11 Y/O          Family History   Problem Relation Age of Onset      GILA Father      Melanoma Father      Breast Cancer Mother      Diabetes Paternal Aunt      Cancer Other         ovarian in mother, prostate in father; melanoma in father     Lipids Other      Thyroid Disease Other         mother, father       Social History     Socioeconomic History     Marital status:      Spouse name: Yasmany     Number of children: 1     Years of education: 14     Highest education level: Not on file   Occupational History     Occupation: [employer]     Comment: Pay roll   Social Needs     Financial resource strain: Not on file     Food insecurity:     Worry: Not on file     Inability: Not on file     Transportation needs:     Medical: Not on file     Non-medical: Not on file   Tobacco Use     Smoking status: Never Smoker     Smokeless tobacco: Never Used   Substance and Sexual Activity     Alcohol use: Yes     Alcohol/week: 0.0 oz     Comment: one drink/month     Drug use: No     Sexual activity: Yes     Partners: Male   Lifestyle     Physical activity:     Days per week: Not on file     Minutes per session: Not on file     Stress: Not on file   Relationships     Social connections:     Talks on phone: Not on file     Gets together: Not on file     Attends Mormonism service: Not on file     Active member of club or organization: Not on file     Attends meetings of clubs or organizations: Not on file     Relationship status: Not on file     Intimate partner violence:     Fear of current or ex partner: Not on file     Emotionally abused: Not on file     Physically abused: Not on file     Forced sexual activity: Not on file   Other Topics Concern     Parent/sibling w/ CABG, MI or angioplasty before 65F 55M? No   Social History Narrative     Not on file       Outpatient Encounter Medications as of 8/14/2019   Medication Sig Dispense Refill     Beclomethasone Dipropionate (QNASL) 80 MCG/ACT AERS Nasal Spray Spray 2 sprays into both nostrils daily 8.7 g 3     calcium 500 MG CHEW  Take 500 mg by mouth daily       cholecalciferol (VITAMIN D3) 5000 UNITS CAPS capsule Take 5,000 Units by mouth daily       folic acid (FOLVITE) 1 MG tablet Take 1 tablet (1,000 mcg) by mouth daily (Needs follow-up appointment for this medication) 30 tablet 0     folic acid (FOLVITE) 1 MG tablet TAKE 1 TABLET BY MOUTH DAILY (NEEDS FOLLOW-UP APPOINTMENT FOR THIS MEDICATION) 30 tablet 0     levothyroxine (SYNTHROID/LEVOTHROID) 200 MCG tablet TAKE 1 TABLET BY MOUTH DAILY 30 tablet 0     montelukast (SINGULAIR) 10 MG tablet Take 1 tablet (10 mg) by mouth 2 times daily 180 tablet 3     Multiple Vitamins-Minerals (WOMENS MULTI VITAMIN & MINERAL PO) Take by mouth daily       simvastatin (ZOCOR) 40 MG tablet Take 1 tablet (40 mg) by mouth At Bedtime 90 tablet 2     vitamin B-12 (CYANOCOBALAMIN) 1000 MCG/ML injection INJECT 0.5ML INTO THE MUSCLE EVERY 30 DAYS ADMINISTER 2 WEEKS AFTER 1000 MCG DOSE 1 mL 0     Facility-Administered Encounter Medications as of 8/14/2019   Medication Dose Route Frequency Provider Last Rate Last Dose     sodium chloride (PF) 0.9% PF flush 3 mL  3 mL Intracatheter Q8H Danitza Childress MD   20 mL at 09/02/15 1651             Review Of Systems  Skin: As above  Eyes: negative  Ears/Nose/Throat: negative  Respiratory: No shortness of breath, dyspnea on exertion, cough, or hemoptysis  Cardiovascular: negative  Gastrointestinal: negative  Genitourinary: negative  Musculoskeletal: negative  Neurologic: negative  Psychiatric: negative  Hematologic/Lymphatic/Immunologic: negative  Endocrine: negative      O:   NAD, WDWN, Alert & Oriented, Mood & Affect wnl, Vitals stable   Here today alone   LMP 06/23/2006    General appearance normal   Vitals stable   Alert, oriented and in no acute distress      Following lymph nodes palpated: Occipital, Cervical, Supraclavicularaxilla  no lad   Chest well healed   Pigmented macule son trunk and ext with regula borders and pigment networks    L leg stuck on  apule       Stuck on papules and brown macules on trunk and ext   Red papules on trunk  Flesh colored papules on trunk     The remainder of the full exam was unremarkable; the following areas were examined:  conjunctiva/lids, oral mucosa, neck, peripheral vascular system, abdomen, lymph nodes, digits/nails, eccrine and apocrine glands, scalp/hair, face, neck, chest, abdomen, buttocks, back, RUE, LUE, RLE, LLE       Eyes: Conjunctivae/lids:Normal     ENT: Lips, buccal mucosa, tongue: normal    MSK:Normal    Cardiovascular: peripheral edema none    Pulm: Breathing Normal    Lymph Nodes: No Head and Neck Lymphadenopathy     Neuro/Psych: Orientation:Normal; Mood/Affect:Normal    A/P:  1. Seborrheic keratosis, lentigo, angioma, dermal nevus, hx of melnaoma  MELANOMA DISCUSSED WITH PATIENT:  I discussed the specifics of tumor, prognosis, metachronous melanoma, self exam, and genetics with the patient. I explained the need for monthly skin exams including and taught the patient how to do this. Patient was asked about new or changing moles . I discussed with patient signs and symptoms that could arise in the setting of recurrent locoregional or metastatic disease. In addition, the need to undergo every 4 month dermatologic full skin survey and evaluation given that patients with a diagnosis of melanoma are at risk of recurrence (local and distant) and of subsequent de divine melanoma.      BENIGN LESIONS DISCUSSED WITH PATIENT:  I discussed the specifics of tumor, prognosis, and genetics of benign lesions.  I explained that treatment of these lesions would be purely cosmetic and not medically neccessary.  I discussed with patient different removal options including excision, cautery and /or laser.      Nature and genetics of benign skin lesions dicussed with patient.  Signs and Symptoms of skin cancer discussed with patient.  ABCDEs of melanoma reviewed with patient.  Patient encouraged to perform monthly skin exams.  UV  precautions reviewed with patient.  Skin care regimen reviewed with patient: Eliminate harsh soaps, i.e. Dial, zest, irsih spring; Mild soaps such as Cetaphil or Dove sensitive skin, avoid hot or cold showers, aggressive use of emollients including vanicream, cetaphil or cerave discussed with patient.    Risks of non-melanoma skin cancer discussed with patient   Return to clinic [address] [place] [address]

## 2019-11-05 ENCOUNTER — HEALTH MAINTENANCE LETTER (OUTPATIENT)
Age: 64
End: 2019-11-05

## 2020-02-16 ENCOUNTER — HEALTH MAINTENANCE LETTER (OUTPATIENT)
Age: 65
End: 2020-02-16

## 2020-08-05 ENCOUNTER — OFFICE VISIT (OUTPATIENT)
Dept: DERMATOLOGY | Facility: CLINIC | Age: 65
End: 2020-08-05
Payer: MEDICARE

## 2020-08-05 VITALS
SYSTOLIC BLOOD PRESSURE: 126 MMHG | WEIGHT: 250 LBS | BODY MASS INDEX: 42.68 KG/M2 | HEART RATE: 85 BPM | HEIGHT: 64 IN | DIASTOLIC BLOOD PRESSURE: 80 MMHG

## 2020-08-05 DIAGNOSIS — D18.01 ANGIOMA OF SKIN: ICD-10-CM

## 2020-08-05 DIAGNOSIS — D23.9 DERMAL NEVUS: ICD-10-CM

## 2020-08-05 DIAGNOSIS — Z85.820 HISTORY OF MELANOMA: ICD-10-CM

## 2020-08-05 DIAGNOSIS — L82.1 SEBORRHEIC KERATOSIS: ICD-10-CM

## 2020-08-05 DIAGNOSIS — L81.4 LENTIGO: Primary | ICD-10-CM

## 2020-08-05 DIAGNOSIS — D22.9 NEVUS: ICD-10-CM

## 2020-08-05 PROCEDURE — 99213 OFFICE O/P EST LOW 20 MIN: CPT | Performed by: DERMATOLOGY

## 2020-08-05 ASSESSMENT — MIFFLIN-ST. JEOR: SCORE: 1656.05

## 2020-08-05 NOTE — LETTER
8/5/2020         RE: Leanna Griffin  [address] [facility] [address]        Dear Colleague,    Thank you for referring your patient, Leanna Griffin, to the [facility]. Please see a copy of my visit note below.    Leanna Griffin is a 65 year old year old female patient here today for h xo fmelanoma 0.35mm chest wall.  She denies any new or changing skin lesions.  Patient reports the following symptoms:  none.  Patient reports the following previous treatments none.  These treatments did not work.  Patient reports the following modifying factors none.  Associated symptoms: none.  Patient has no other skin complaints today.  Remainder of the HPI, Meds, PMH, Allergies, FH, and SH was reviewed in chart.      Past Medical History:   Diagnosis Date     Abnormal maternal glucose tolerance, complicating pregnancy, childbirth, or the puerperium, unspecified as to episode of care 1974    Gestational diabetes     Gouty arthropathy 4/20/1996    Single exacerbation without recurrence     Hyperlipidemia LDL goal <130 10/31/2010    Neuropathy symptoms.  Stop the statins for 8 weeks and recheck.  If pain in feet/legs goes away, we need to find an alternative for treatment.  If no change in neuropathy, restart.       Hypertension goal BP (blood pressure) < 140/90 4/20/2006    Last exam: February 19, 2007 BPs: 162/92 - 140/86 Meds: intollerant of Hctz 25 started (February 19, 2007) Labs:Obtained February 19, 2007 BPs in April, July and here have been high, and BP at [facility] also high. Htn survey negative except for thyroid symptoms.  Also taking OCP and recommend stopping     HYPOTHYROIDISM NOS 11/23/2004    TSH    10.06   12/12/2014 .  Increase to 137. TSH     8.56   2/9/2015 - increase to 150.        Lyme disease 11/23/2004     Malignant melanoma (H)      Vitamin B12 deficiency (non anemic) 9/11/2014    Supplement with oral b12 and recheck in 12 weeks.         Past Surgical History:   Procedure Laterality  Date     HC CLOSED TX METACARPAL FX W EXT FIXATION, W MANIP, EACH Left 2005    Left hand. Related to injury     HC REMOVAL OF TONSILS,<13 Y/O          Family History   Problem Relation Age of Onset     C.A.D. Father      Melanoma Father      Breast Cancer Mother      Diabetes Paternal Aunt      Cancer Other         ovarian in mother, prostate in father; melanoma in father     Lipids Other      Thyroid Disease Other         mother, father       Social History     Socioeconomic History     Marital status:      Spouse name: Yasmany     Number of children: 1     Years of education: 14     Highest education level: Not on file   Occupational History     Occupation: [employer]     Comment: Pay roll   Social Needs     Financial resource strain: Not on file     Food insecurity     Worry: Not on file     Inability: Not on file     Transportation needs     Medical: Not on file     Non-medical: Not on file   Tobacco Use     Smoking status: Never Smoker     Smokeless tobacco: Never Used   Substance and Sexual Activity     Alcohol use: Yes     Alcohol/week: 0.0 standard drinks     Comment: one drink/month     Drug use: No     Sexual activity: Yes     Partners: Male   Lifestyle     Physical activity     Days per week: Not on file     Minutes per session: Not on file     Stress: Not on file   Relationships     Social connections     Talks on phone: Not on file     Gets together: Not on file     Attends Orthodoxy service: Not on file     Active member of club or organization: Not on file     Attends meetings of clubs or organizations: Not on file     Relationship status: Not on file     Intimate partner violence     Fear of current or ex partner: Not on file     Emotionally abused: Not on file     Physically abused: Not on file     Forced sexual activity: Not on file   Other Topics Concern     Parent/sibling w/ CABG, MI or angioplasty before 65F 55M? No   Social History Narrative     Not on file       Outpatient  "Encounter Medications as of 8/5/2020   Medication Sig Dispense Refill     Beclomethasone Dipropionate (QNASL) 80 MCG/ACT AERS Nasal Spray Spray 2 sprays into both nostrils daily (Patient not taking: Reported on 8/14/2019) 8.7 g 3     calcium 500 MG CHEW Take 500 mg by mouth daily       cholecalciferol (VITAMIN D3) 5000 UNITS CAPS capsule Take 5,000 Units by mouth daily       folic acid (FOLVITE) 1 MG tablet Take 1 tablet (1,000 mcg) by mouth daily (Needs follow-up appointment for this medication) 30 tablet 0     folic acid (FOLVITE) 1 MG tablet TAKE 1 TABLET BY MOUTH DAILY (NEEDS FOLLOW-UP APPOINTMENT FOR THIS MEDICATION) 30 tablet 0     levothyroxine (SYNTHROID/LEVOTHROID) 200 MCG tablet TAKE 1 TABLET BY MOUTH DAILY 30 tablet 0     montelukast (SINGULAIR) 10 MG tablet Take 1 tablet (10 mg) by mouth 2 times daily 180 tablet 3     Multiple Vitamins-Minerals (WOMENS MULTI VITAMIN & MINERAL PO) Take by mouth daily       simvastatin (ZOCOR) 40 MG tablet Take 1 tablet (40 mg) by mouth At Bedtime 90 tablet 2     vitamin B-12 (CYANOCOBALAMIN) 1000 MCG/ML injection INJECT 0.5ML INTO THE MUSCLE EVERY 30 DAYS ADMINISTER 2 WEEKS AFTER 1000 MCG DOSE 1 mL 0     Facility-Administered Encounter Medications as of 8/5/2020   Medication Dose Route Frequency Provider Last Rate Last Dose     sodium chloride (PF) 0.9% PF flush 3 mL  3 mL Intracatheter Q8H Danitza Childress MD   20 mL at 09/02/15 1651             Review Of Systems  Skin: As above  Eyes: negative  Ears/Nose/Throat: negative  Respiratory: No shortness of breath, dyspnea on exertion, cough, or hemoptysis  Cardiovascular: negative  Gastrointestinal: negative  Genitourinary: negative  Musculoskeletal: negative  Neurologic: negative  Psychiatric: negative  Hematologic/Lymphatic/Immunologic: negative  Endocrine: negative      O:   NAD, WDWN, Alert & Oriented, Mood & Affect wnl, Vitals stable   Here today alone   /80   Pulse 85   Ht 1.613 m (5' 3.5\")   Wt 113.4 kg (250 " lb)   LMP 06/23/2006   BMI 43.59 kg/m     General appearance normal   Vitals stable   Alert, oriented and in no acute distress      Following lymph nodes palpated: Occipital, Cervical, Supraclavicular no lad   pigmetned macule son trunk and ext with regtula borders and pigment networks      Stuck on papules and brown macules on trunk and ext   Red papules on trunk  Flesh colored papules on trunk     The remainder of the full exam was normal; the following areas were examined:  conjunctiva/lids, oral mucosa, neck, peripheral vascular system, abdomen, lymph nodes, digits/nails, eccrine and apocrine glands, scalp/hair, face, neck, chest, abdomen, buttocks, back, RUE, LUE, RLE, LLE       Eyes: Conjunctivae/lids:Normal     ENT: Lips, buccal mucosa, tongue: normal    MSK:Normal    Cardiovascular: peripheral edema none    Pulm: Breathing Normal    Lymph Nodes: No Head and Neck Lymphadenopathy     Neuro/Psych: Orientation:Alert and Orientedx3 ; Mood/Affect:normal       A/P:  1. Seborrheic keratosis, lentigo, angioma, dermal nevus, hx of melanoma, nevi     BENIGN LESIONS DISCUSSED WITH PATIENT:  I discussed the specifics of tumor, prognosis, and genetics of benign lesions.  I explained that treatment of these lesions would be purely cosmetic and not medically neccessary.  I discussed with patient different removal options including excision, cautery and /or laser.      Nature and genetics of benign skin lesions dicussed with patient.  Signs and Symptoms of skin cancer discussed with patient.  ABCDEs of melanoma reviewed with patient.  Patient encouraged to perform monthly skin exams.  UV precautions reviewed with patient.  Skin care regimen reviewed with patient: Eliminate harsh soaps, i.e. Dial, zest, irsih spring; Mild soaps such as Cetaphil or Dove sensitive skin, avoid hot or cold showers, aggressive use of emollients including vanicream, cetaphil or cerave discussed with patient.    Risks of non-melanoma skin cancer  discussed with patient   Return to clinic 6 months      Again, thank you for allowing me to participate in the care of your patient.        Sincerely,        Garfield Valdes MD

## 2020-08-05 NOTE — NURSING NOTE
"Chief Complaint   Patient presents with     Skin Check       Initial /80   Pulse 85   Ht 1.613 m (5' 3.5\")   Wt 113.4 kg (250 lb)   LMP 06/23/2006   BMI 43.59 kg/m   Estimated body mass index is 43.59 kg/m  as calculated from the following:    Height as of this encounter: 1.613 m (5' 3.5\").    Weight as of this encounter: 113.4 kg (250 lb).  Medications and allergies reviewed.      Clarissa VINCENT MA    "

## 2020-08-05 NOTE — PROGRESS NOTES
Leanna Griffin is a 65 year old year old female patient here today for h xo fmelanoma 0.35mm chest wall.  She denies any new or changing skin lesions.  Patient reports the following symptoms:  none.  Patient reports the following previous treatments none.  These treatments did not work.  Patient reports the following modifying factors none.  Associated symptoms: none.  Patient has no other skin complaints today.  Remainder of the HPI, Meds, PMH, Allergies, FH, and SH was reviewed in chart.      Past Medical History:   Diagnosis Date     Abnormal maternal glucose tolerance, complicating pregnancy, childbirth, or the puerperium, unspecified as to episode of care 1974    Gestational diabetes     Gouty arthropathy 4/20/1996    Single exacerbation without recurrence     Hyperlipidemia LDL goal <130 10/31/2010    Neuropathy symptoms.  Stop the statins for 8 weeks and recheck.  If pain in feet/legs goes away, we need to find an alternative for treatment.  If no change in neuropathy, restart.       Hypertension goal BP (blood pressure) < 140/90 4/20/2006    Last exam: February 19, 2007 BPs: 162/92 - 140/86 Meds: intollerant of Hctz 25 started (February 19, 2007) Labs:Obtained February 19, 2007 BPs in April, July and here have been high, and BP at [facility] also high. Htn survey negative except for thyroid symptoms.  Also taking OCP and recommend stopping     HYPOTHYROIDISM NOS 11/23/2004    TSH    10.06   12/12/2014 .  Increase to 137. TSH     8.56   2/9/2015 - increase to 150.        Lyme disease 11/23/2004     Malignant melanoma (H)      Vitamin B12 deficiency (non anemic) 9/11/2014    Supplement with oral b12 and recheck in 12 weeks.         Past Surgical History:   Procedure Laterality Date     HC CLOSED TX METACARPAL FX W EXT FIXATION, W MANIP, EACH Left 2005    Left hand. Related to injury     HC REMOVAL OF TONSILS,<11 Y/O          Family History   Problem Relation Age of Onset     C.A.D. Father      Melanoma Father       Breast Cancer Mother      Diabetes Paternal Aunt      Cancer Other         ovarian in mother, prostate in father; melanoma in father     Lipids Other      Thyroid Disease Other         mother, father       Social History     Socioeconomic History     Marital status:      Spouse name: Yasmany     Number of children: 1     Years of education: 14     Highest education level: Not on file   Occupational History     Occupation: [employer]     Comment: Pay roll   Social Needs     Financial resource strain: Not on file     Food insecurity     Worry: Not on file     Inability: Not on file     Transportation needs     Medical: Not on file     Non-medical: Not on file   Tobacco Use     Smoking status: Never Smoker     Smokeless tobacco: Never Used   Substance and Sexual Activity     Alcohol use: Yes     Alcohol/week: 0.0 standard drinks     Comment: one drink/month     Drug use: No     Sexual activity: Yes     Partners: Male   Lifestyle     Physical activity     Days per week: Not on file     Minutes per session: Not on file     Stress: Not on file   Relationships     Social connections     Talks on phone: Not on file     Gets together: Not on file     Attends [employer] service: Not on file     Active member of club or organization: Not on file     Attends meetings of clubs or organizations: Not on file     Relationship status: Not on file     Intimate partner violence     Fear of current or ex partner: Not on file     Emotionally abused: Not on file     Physically abused: Not on file     Forced sexual activity: Not on file   Other Topics Concern     Parent/sibling w/ CABG, MI or angioplasty before 65F 55M? No   Social History Narrative     Not on file       Outpatient Encounter Medications as of 8/5/2020   Medication Sig Dispense Refill     Beclomethasone Dipropionate (QNASL) 80 MCG/ACT AERS Nasal Spray Spray 2 sprays into both nostrils daily (Patient not taking: Reported on 8/14/2019) 8.7 g 3     calcium 500 MG  "CHEW Take 500 mg by mouth daily       cholecalciferol (VITAMIN D3) 5000 UNITS CAPS capsule Take 5,000 Units by mouth daily       folic acid (FOLVITE) 1 MG tablet Take 1 tablet (1,000 mcg) by mouth daily (Needs follow-up appointment for this medication) 30 tablet 0     folic acid (FOLVITE) 1 MG tablet TAKE 1 TABLET BY MOUTH DAILY (NEEDS FOLLOW-UP APPOINTMENT FOR THIS MEDICATION) 30 tablet 0     levothyroxine (SYNTHROID/LEVOTHROID) 200 MCG tablet TAKE 1 TABLET BY MOUTH DAILY 30 tablet 0     montelukast (SINGULAIR) 10 MG tablet Take 1 tablet (10 mg) by mouth 2 times daily 180 tablet 3     Multiple Vitamins-Minerals (WOMENS MULTI VITAMIN & MINERAL PO) Take by mouth daily       simvastatin (ZOCOR) 40 MG tablet Take 1 tablet (40 mg) by mouth At Bedtime 90 tablet 2     vitamin B-12 (CYANOCOBALAMIN) 1000 MCG/ML injection INJECT 0.5ML INTO THE MUSCLE EVERY 30 DAYS ADMINISTER 2 WEEKS AFTER 1000 MCG DOSE 1 mL 0     Facility-Administered Encounter Medications as of 8/5/2020   Medication Dose Route Frequency Provider Last Rate Last Dose     sodium chloride (PF) 0.9% PF flush 3 mL  3 mL Intracatheter Q8H Danitza Childress MD   20 mL at 09/02/15 1651             Review Of Systems  Skin: As above  Eyes: negative  Ears/Nose/Throat: negative  Respiratory: No shortness of breath, dyspnea on exertion, cough, or hemoptysis  Cardiovascular: negative  Gastrointestinal: negative  Genitourinary: negative  Musculoskeletal: negative  Neurologic: negative  Psychiatric: negative  Hematologic/Lymphatic/Immunologic: negative  Endocrine: negative      O:   NAD, WDWN, Alert & Oriented, Mood & Affect wnl, Vitals stable   Here today alone   /80   Pulse 85   Ht 1.613 m (5' 3.5\")   Wt 113.4 kg (250 lb)   LMP 06/23/2006   BMI 43.59 kg/m     General appearance normal   Vitals stable   Alert, oriented and in no acute distress      Following lymph nodes palpated: Occipital, Cervical, Supraclavicular no lad   pigmetned macule son trunk and ext " with regtula borders and pigment networks      Stuck on papules and brown macules on trunk and ext   Red papules on trunk  Flesh colored papules on trunk     The remainder of the full exam was normal; the following areas were examined:  conjunctiva/lids, oral mucosa, neck, peripheral vascular system, abdomen, lymph nodes, digits/nails, eccrine and apocrine glands, scalp/hair, face, neck, chest, abdomen, buttocks, back, RUE, LUE, RLE, LLE       Eyes: Conjunctivae/lids:Normal     ENT: Lips, buccal mucosa, tongue: normal    MSK:Normal    Cardiovascular: peripheral edema none    Pulm: Breathing Normal    Lymph Nodes: No Head and Neck Lymphadenopathy     Neuro/Psych: Orientation:Alert and Orientedx3 ; Mood/Affect:normal       A/P:  1. Seborrheic keratosis, lentigo, angioma, dermal nevus, hx of melanoma, nevi     BENIGN LESIONS DISCUSSED WITH PATIENT:  I discussed the specifics of tumor, prognosis, and genetics of benign lesions.  I explained that treatment of these lesions would be purely cosmetic and not medically neccessary.  I discussed with patient different removal options including excision, cautery and /or laser.      Nature and genetics of benign skin lesions dicussed with patient.  Signs and Symptoms of skin cancer discussed with patient.  ABCDEs of melanoma reviewed with patient.  Patient encouraged to perform monthly skin exams.  UV precautions reviewed with patient.  Skin care regimen reviewed with patient: Eliminate harsh soaps, i.e. Dial, zest, irsih spring; Mild soaps such as Cetaphil or Dove sensitive skin, avoid hot or cold showers, aggressive use of emollients including vanicream, cetaphil or cerave discussed with patient.    Risks of non-melanoma skin cancer discussed with patient   Return to clinic 6 months

## 2020-11-22 ENCOUNTER — HEALTH MAINTENANCE LETTER (OUTPATIENT)
Age: 65
End: 2020-11-22

## 2021-08-26 ENCOUNTER — OFFICE VISIT (OUTPATIENT)
Dept: DERMATOLOGY | Facility: CLINIC | Age: 66
End: 2021-08-26
Payer: MEDICARE

## 2021-08-26 VITALS — SYSTOLIC BLOOD PRESSURE: 121 MMHG | HEART RATE: 83 BPM | DIASTOLIC BLOOD PRESSURE: 81 MMHG | OXYGEN SATURATION: 97 %

## 2021-08-26 DIAGNOSIS — L82.1 SEBORRHEIC KERATOSIS: ICD-10-CM

## 2021-08-26 DIAGNOSIS — Z85.820 HISTORY OF MELANOMA: ICD-10-CM

## 2021-08-26 DIAGNOSIS — D22.9 NEVUS: Primary | ICD-10-CM

## 2021-08-26 DIAGNOSIS — D18.01 ANGIOMA OF SKIN: ICD-10-CM

## 2021-08-26 DIAGNOSIS — L81.4 LENTIGO: ICD-10-CM

## 2021-08-26 PROCEDURE — 99213 OFFICE O/P EST LOW 20 MIN: CPT | Performed by: PHYSICIAN ASSISTANT

## 2021-08-26 NOTE — LETTER
8/26/2021         RE: Leanna Griffin  [id number] [facility] [id number]        Dear Colleague,    Thank you for referring your patient, Leanna Griffin, to the [facility]. Please see a copy of my visit note below.    HPI:   Chief complaints: Leanna Griffin is a pleasant 66 year old female who presents for Full skin cancer screening to rule out skin cancer   Last Skin Exam: 1 year ago      1st Baseline: no  Personal HX of Skin Cancer: Yes melanoma of the chest   Personal HX of Malignant Melanoma: yes history of melanoma on the chest in 2018 depth 0.35 mm  Family HX of Skin Cancer / Malignant Melanoma: No  Personal HX of Atypical Moles:   no  Risk factors: history of sun exposure and burns  New / Changing lesions:no  Social History: here today with ; goes to [place] during the winter months  On review of systems, there are no further skin complaints, patient is feeling otherwise well.   ROS of the following were done and are negative: Constitutional, Eyes, Ears, Nose,   Mouth, Throat, Cardiovascular, Respiratory, GI, Genitourinary, Musculoskeletal,   Psychiatric, Endocrine, Allergic/Immunologic.    PHYSICAL EXAM:   /81 (BP Location: Right arm, Patient Position: Sitting, Cuff Size: Adult Large)   Pulse 83   LMP 06/23/2006   SpO2 97%   Skin exam performed as follows: Type 2 skin. Mood appropriate  Alert and Oriented X 3. Well developed, well nourished in no distress.  General appearance: Normal  Head including face: Normal  Eyes: conjunctiva and lids: Normal  Mouth: Lips, teeth, gums: Normal  Neck: Normal  Chest-breast/axillae: Normal  Back: Normal  Spleen and liver: Normal  Cardiovascular: Exam of peripheral vascular system by observation for swelling, varicosities, edema: Normal  Genitalia: groin, buttocks: Normal  Extremities: digits/nails (clubbing): Normal  Eccrine and Apocrine glands: Normal  Right upper extremity: Normal  Left upper extremity: Normal  Right lower  extremity: Normal  Left lower extremity: Normal  Skin: Scalp and body hair: See below    Pt deferred exam of breasts, groin, buttocks: No    Other physical findings:  1. Multiple pigmented macules on extremities and trunk  2. Multiple pigmented macules on face, trunk and extremities  3. Multiple vascular papules on trunk, arms and legs  4. Multiple scattered keratotic plaques       Except as noted above, no other signs of skin cancer or melanoma.     ASSESSMENT/PLAN:   Benign Full skin cancer screening today. . Patient with history of none  Advised on monthly self exams and 1 year  Patient Education: Appropriate brochures given.    1. Multiple benign appearing nevi on arms, legs and trunk. Discussed ABCDEs of melanoma and sunscreen.   2. Multiple lentigos on arms, legs and trunk. Advised benign, no treatment needed.  3. Multiple scattered angiomas. Advised benign, no treatment needed.   4. Seborrheic keratosis on arms, legs and trunk. Advised benign, no treatment needed.              Follow-up: 6-12 month FSE/PRN sooner    1.) Patient was asked about new and changing moles. YES  2.) Patient received a complete physical skin examination: YES  3.) Patient was counseled to perform a monthly self skin examination: YES  Scribed By: Leidy Lala, MS, PAPEDRO          Again, thank you for allowing me to participate in the care of your patient.        Sincerely,        Leidy Lala PA-C

## 2021-08-26 NOTE — PROGRESS NOTES
HPI:   Chief complaints: Leanna Griffin is a pleasant 66 year old female who presents for Full skin cancer screening to rule out skin cancer   Last Skin Exam: 1 year ago      1st Baseline: no  Personal HX of Skin Cancer: Yes melanoma of the chest   Personal HX of Malignant Melanoma: yes history of melanoma on the chest in 2018 depth 0.35 mm  Family HX of Skin Cancer / Malignant Melanoma: No  Personal HX of Atypical Moles:   no  Risk factors: history of sun exposure and burns  New / Changing lesions:no  Social History: here today with ; goes to [place] during the winter months  On review of systems, there are no further skin complaints, patient is feeling otherwise well.   ROS of the following were done and are negative: Constitutional, Eyes, Ears, Nose,   Mouth, Throat, Cardiovascular, Respiratory, GI, Genitourinary, Musculoskeletal,   Psychiatric, Endocrine, Allergic/Immunologic.    PHYSICAL EXAM:   /81 (BP Location: Right arm, Patient Position: Sitting, Cuff Size: Adult Large)   Pulse 83   LMP 06/23/2006   SpO2 97%   Skin exam performed as follows: Type 2 skin. Mood appropriate  Alert and Oriented X 3. Well developed, well nourished in no distress.  General appearance: Normal  Head including face: Normal  Eyes: conjunctiva and lids: Normal  Mouth: Lips, teeth, gums: Normal  Neck: Normal  Chest-breast/axillae: Normal  Back: Normal  Spleen and liver: Normal  Cardiovascular: Exam of peripheral vascular system by observation for swelling, varicosities, edema: Normal  Genitalia: groin, buttocks: Normal  Extremities: digits/nails (clubbing): Normal  Eccrine and Apocrine glands: Normal  Right upper extremity: Normal  Left upper extremity: Normal  Right lower extremity: Normal  Left lower extremity: Normal  Skin: Scalp and body hair: See below    Pt deferred exam of breasts, groin, buttocks: No    Other physical findings:  1. Multiple pigmented macules on extremities and trunk  2. Multiple pigmented macules on  face, trunk and extremities  3. Multiple vascular papules on trunk, arms and legs  4. Multiple scattered keratotic plaques       Except as noted above, no other signs of skin cancer or melanoma.     ASSESSMENT/PLAN:   Benign Full skin cancer screening today. . Patient with history of none  Advised on monthly self exams and 1 year  Patient Education: Appropriate brochures given.    1. Multiple benign appearing nevi on arms, legs and trunk. Discussed ABCDEs of melanoma and sunscreen.   2. Multiple lentigos on arms, legs and trunk. Advised benign, no treatment needed.  3. Multiple scattered angiomas. Advised benign, no treatment needed.   4. Seborrheic keratosis on arms, legs and trunk. Advised benign, no treatment needed.              Follow-up: 6-12 month FSE/PRN sooner    1.) Patient was asked about new and changing moles. YES  2.) Patient received a complete physical skin examination: YES  3.) Patient was counseled to perform a monthly self skin examination: YES  Scribed By: Leidy Lala MS, PA-C

## 2021-08-26 NOTE — NURSING NOTE
Chief Complaint   Patient presents with     Skin Check       Vitals:    08/26/21 1453   BP: 121/81   BP Location: Right arm   Patient Position: Sitting   Cuff Size: Adult Large   Pulse: 83   SpO2: 97%     Wt Readings from Last 1 Encounters:   08/05/20 113.4 kg (250 lb)       Shira Guevara LPN .................8/26/2021

## 2021-09-19 ENCOUNTER — HEALTH MAINTENANCE LETTER (OUTPATIENT)
Age: 66
End: 2021-09-19

## 2022-01-09 ENCOUNTER — HEALTH MAINTENANCE LETTER (OUTPATIENT)
Age: 67
End: 2022-01-09

## 2022-07-18 ENCOUNTER — OFFICE VISIT (OUTPATIENT)
Dept: DERMATOLOGY | Facility: CLINIC | Age: 67
End: 2022-07-18
Payer: MEDICARE

## 2022-07-18 DIAGNOSIS — L81.4 LENTIGO: ICD-10-CM

## 2022-07-18 DIAGNOSIS — D22.9 NEVUS: Primary | ICD-10-CM

## 2022-07-18 DIAGNOSIS — D18.01 ANGIOMA OF SKIN: ICD-10-CM

## 2022-07-18 DIAGNOSIS — Z85.820 HISTORY OF MELANOMA: ICD-10-CM

## 2022-07-18 DIAGNOSIS — L82.1 SEBORRHEIC KERATOSIS: ICD-10-CM

## 2022-07-18 PROCEDURE — 99213 OFFICE O/P EST LOW 20 MIN: CPT | Performed by: PHYSICIAN ASSISTANT

## 2022-07-18 RX ORDER — LEVOTHYROXINE SODIUM 175 UG/1
TABLET ORAL
COMMUNITY
Start: 2022-05-16

## 2022-07-18 RX ORDER — ALENDRONATE SODIUM 70 MG/1
TABLET ORAL
COMMUNITY
Start: 2022-05-16

## 2022-07-18 ASSESSMENT — PAIN SCALES - GENERAL: PAINLEVEL: NO PAIN (0)

## 2022-07-18 NOTE — LETTER
7/18/2022         RE: Leanna Griffin  [address] [facility] [id number]        Dear Colleague,    Thank you for referring your patient, Leanna Griffin, to the [facility]. Please see a copy of my visit note below.    HPI:   Chief complaints: Leanna Griffin is a pleasant 67 year old female who presents for Full skin cancer screening to rule out skin cancer   Last Skin Exam: 1 year ago      1st Baseline: no  Personal HX of Skin Cancer: Yes melanoma of the chest   Personal HX of Malignant Melanoma: yes history of melanoma on the chest in 2018 depth 0.35 mm  Family HX of Skin Cancer / Malignant Melanoma: Yes father with a history of skin cancer  Personal HX of Atypical Moles:   no  Risk factors: history of sun exposure and burns  New / Changing lesions:no  Social History: goes to [place], [place] during the winter months  On review of systems, there are no further skin complaints, patient is feeling otherwise well.   ROS of the following were done and are negative: Constitutional, Eyes, Ears, Nose,   Mouth, Throat, Cardiovascular, Respiratory, GI, Genitourinary, Musculoskeletal,   Psychiatric, Endocrine, Allergic/Immunologic.    PHYSICAL EXAM:   [facility] 06/23/2006   Skin exam performed as follows: Type 2 skin. Mood appropriate  Alert and Oriented X 3. Well developed, well nourished in no distress.  General appearance: Normal  Head including face: Normal  Eyes: conjunctiva and lids: Normal  Mouth: Lips, teeth, gums: Normal  Neck: Normal  Chest-breast/axillae: Normal  Back: Normal  Spleen and liver: Normal  Cardiovascular: Exam of peripheral vascular system by observation for swelling, varicosities, edema: Normal  Genitalia: groin, buttocks: Normal  Extremities: digits/nails (clubbing): Normal  Eccrine and Apocrine glands: Normal  Right upper extremity: Normal  Left upper extremity: Normal  Right lower extremity: Normal  Left lower extremity: Normal  Skin: Scalp and body hair: See below    Pt  deferred exam of breasts, groin, buttocks: No    Other physical findings:  1. Multiple pigmented macules on extremities and trunk  2. Multiple pigmented macules on face, trunk and extremities  3. Multiple vascular papules on trunk, arms and legs  4. Multiple scattered keratotic plaques       Except as noted above, no other signs of skin cancer or melanoma.     ASSESSMENT/PLAN:   Benign Full skin cancer screening today. . Patient with history of melanoma   Advised on monthly self exams and 1 year  Patient Education: Appropriate brochures given.    1. Multiple benign appearing nevi on arms, legs and trunk. Discussed ABCDEs of melanoma and sunscreen.   2. Multiple lentigos on arms, legs and trunk. Advised benign, no treatment needed.  3. Multiple scattered angiomas. Advised benign, no treatment needed.   4. Seborrheic keratosis on arms, legs and trunk. Advised benign, no treatment needed.              Follow-up: yearly FSE/PRN sooner    1.) Patient was asked about new and changing moles. YES  2.) Patient received a complete physical skin examination: YES  3.) Patient was counseled to perform a monthly self skin examination: YES  Scribed By: Leidy Lala, MS, PA-C          Again, thank you for allowing me to participate in the care of your patient.        Sincerely,        Leidy Lala PA-C

## 2022-07-18 NOTE — PROGRESS NOTES
HPI:   Chief complaints: Leanna Griffin is a pleasant 67 year old female who presents for Full skin cancer screening to rule out skin cancer   Last Skin Exam: 1 year ago      1st Baseline: no  Personal HX of Skin Cancer: Yes melanoma of the chest   Personal HX of Malignant Melanoma: yes history of melanoma on the chest in 2018 depth 0.35 mm  Family HX of Skin Cancer / Malignant Melanoma: Yes father with a history of skin cancer  Personal HX of Atypical Moles:   no  Risk factors: history of sun exposure and burns  New / Changing lesions:no  Social History: goes to [place], [place] during the winter months  On review of systems, there are no further skin complaints, patient is feeling otherwise well.   ROS of the following were done and are negative: Constitutional, Eyes, Ears, Nose,   Mouth, Throat, Cardiovascular, Respiratory, GI, Genitourinary, Musculoskeletal,   Psychiatric, Endocrine, Allergic/Immunologic.    PHYSICAL EXAM:   [facility] 06/23/2006   Skin exam performed as follows: Type 2 skin. Mood appropriate  Alert and Oriented X 3. Well developed, well nourished in no distress.  General appearance: Normal  Head including face: Normal  Eyes: conjunctiva and lids: Normal  Mouth: Lips, teeth, gums: Normal  Neck: Normal  Chest-breast/axillae: Normal  Back: Normal  Spleen and liver: Normal  Cardiovascular: Exam of peripheral vascular system by observation for swelling, varicosities, edema: Normal  Genitalia: groin, buttocks: Normal  Extremities: digits/nails (clubbing): Normal  Eccrine and Apocrine glands: Normal  Right upper extremity: Normal  Left upper extremity: Normal  Right lower extremity: Normal  Left lower extremity: Normal  Skin: Scalp and body hair: See below    Pt deferred exam of breasts, groin, buttocks: No    Other physical findings:  1. Multiple pigmented macules on extremities and trunk  2. Multiple pigmented macules on face, trunk and extremities  3. Multiple vascular papules on trunk, arms and legs  4.  Multiple scattered keratotic plaques       Except as noted above, no other signs of skin cancer or melanoma.     ASSESSMENT/PLAN:   Benign Full skin cancer screening today. . Patient with history of melanoma   Advised on monthly self exams and 1 year  Patient Education: Appropriate brochures given.    1. Multiple benign appearing nevi on arms, legs and trunk. Discussed ABCDEs of melanoma and sunscreen.   2. Multiple lentigos on arms, legs and trunk. Advised benign, no treatment needed.  3. Multiple scattered angiomas. Advised benign, no treatment needed.   4. Seborrheic keratosis on arms, legs and trunk. Advised benign, no treatment needed.              Follow-up: yearly FSE/PRN sooner    1.) Patient was asked about new and changing moles. YES  2.) Patient received a complete physical skin examination: YES  3.) Patient was counseled to perform a monthly self skin examination: YES  Scribed By: Leidy Lala MS, PA-C

## 2022-11-21 ENCOUNTER — HEALTH MAINTENANCE LETTER (OUTPATIENT)
Age: 67
End: 2022-11-21

## 2023-06-01 ENCOUNTER — OFFICE VISIT (OUTPATIENT)
Dept: DERMATOLOGY | Facility: CLINIC | Age: 68
End: 2023-06-01
Payer: MEDICARE

## 2023-06-01 DIAGNOSIS — L81.4 LENTIGO: ICD-10-CM

## 2023-06-01 DIAGNOSIS — L82.1 SEBORRHEIC KERATOSIS: ICD-10-CM

## 2023-06-01 DIAGNOSIS — L82.0 INFLAMED SEBORRHEIC KERATOSIS: ICD-10-CM

## 2023-06-01 DIAGNOSIS — D22.9 NEVUS: ICD-10-CM

## 2023-06-01 DIAGNOSIS — L57.0 ACTINIC KERATOSIS: ICD-10-CM

## 2023-06-01 DIAGNOSIS — Z85.820 HISTORY OF MELANOMA: Primary | ICD-10-CM

## 2023-06-01 DIAGNOSIS — D18.01 ANGIOMA OF SKIN: ICD-10-CM

## 2023-06-01 PROCEDURE — 99213 OFFICE O/P EST LOW 20 MIN: CPT | Mod: 25 | Performed by: PHYSICIAN ASSISTANT

## 2023-06-01 PROCEDURE — 17110 DESTRUCTION B9 LES UP TO 14: CPT | Performed by: PHYSICIAN ASSISTANT

## 2023-06-01 PROCEDURE — 17000 DESTRUCT PREMALG LESION: CPT | Mod: 59 | Performed by: PHYSICIAN ASSISTANT

## 2023-06-01 ASSESSMENT — PAIN SCALES - GENERAL: PAINLEVEL: NO PAIN (0)

## 2023-06-01 NOTE — PATIENT INSTRUCTIONS
WOUND CARE INSTRUCTIONS   FOR CRYOSURGERY   This area treated with liquid nitrogen should form a blister (areas treated may or may not blister-skin may just turn dark and slough off). You do not need to bandage the area unless a blister forms and breaks (which may be a few days). When the blister breaks, begin daily dressing changes as follows:  1) Clean and dry the area with tap water using clean Q-tip or sterile gauze pad.   2) Apply Polysporin ointment or Bacitracin ointment over entire wound. Do NOT use Neosporin ointment.   3) Cover the wound with a band-aid or sterile non-stick gauze pad and micropore paper tape.   REPEAT THESE INSTRUCTIONS AT LEAST ONCE A DAY UNTIL THE WOUND HAS COMPLETELY HEALED.   It is an old wives tale that a wound heals better when it is exposed to air and allowed to dry out. The wound will heal faster with a better cosmetic result if it is kept moist with ointment and covered with a bandage.   Do not let the wound dry out.   IMPORTANT INFORMATION ON REVERSE SIDE   Supplies Needed:   *Cotton tipped applicators (Q-tips)   *Polysporin ointment or Bacitracin ointment (NOT NEOSPORIN)   *Band-aids, or non stick gauze pads and micropore paper tape   PATIENT INFORMATION   During the healing process you will notice a number of changes. All wounds develop a small halo of redness surrounding the wound. This means healing is occurring. Severe itching with extensive redness usually indicates sensitivity to the ointment or bandage tape used to dress the wound. You should call our office if this develops.   Swelling and/or discoloration around your surgical site is common, particularly when performed around the eye.   All wounds normally drain. The larger the wound the more drainage there will be. After 7-10 days, you will notice the wound beginning to shrink and new skin will begin to grow. The wound is healed when you can see skin has formed over the entire area. A healed wound has a healthy, shiny  look to the surface and is red to dark pink in color to normalize. Wounds may take approximately 4-6 weeks to heal. Larger wounds may take 6-8 weeks. After the wound is healed you may discontinue dressing changes.   You may experience a sensation of tightness as your wound heals. This is normal and will gradually subside.   Your healed wound may be sensitive to temperature changes. This sensitivity improves with time, but if you re having a lot of discomfort, try to avoid temperature extremes.   Patients frequently experience itching after their wound appears to have healed because of the continue healing under the skin. Plain Vaseline will help relieve the itching.          Proper skin care from [facility]:    -Eliminate harsh soaps as they strip the natural oils from the skin, often resulting in dry itchy skin ( i.e. Dial, Zest, [place]ander Spring)  -Use mild soaps such as Cetaphil or Dove Sensitive Skin in the shower. You do not need to use soap on arms, legs, and trunk every time you shower unless visibly soiled.   -Avoid hot or cold showers.  -After showering, lightly dry off and apply moisturizing within 2-3 minutes. This will help trap moisture in the skin.   -Aggressive use of a moisturizer at least 1-2 times a day to the entire body (including -Vanicream, Cetaphil, Aquaphor or Cerave) and moisturize hands after every washing.  -We recommend using moisturizers that come in a tub that needs to be scooped out, not a pump. This has more of an oil base. It will hold moisture in your skin much better than a water base moisturizer. The above recommended are non-pore clogging.      Wear a sunscreen with at least SPF 30 on your face, ears, neck and V of the chest daily. Wear sunscreen on other areas of the body if those areas are exposed to the sun throughout the day. Sunscreens can contain physical and/or chemical blockers. Physical blockers are less likely to clog pores, these include zinc oxide and titanium  dioxide. Reapply every two hour and after swimming.     Sunscreen examples: https://www.ewg.org/sunscreen/    UV radiation  UVA radiation remains constant throughout the day and throughout the year. It is a longer wavelength than UVB and therefore penetrates deeper into the skin leading to immediate and delayed tanning, photoaging, and skin cancer. 70-80% of UVA and UVB radiation occurs between the hours of 10am-2pm.  UVB radiation  UVB radiation causes the most harmful effects and is more significant during the summer months. However, snow and ice can reflect UVB radiation leading to skin damage during the winter months as well. UVB radiation is responsible for tanning, burning, inflammation, delayed erythema (pinkness), pigmentation (brown spots), and skin cancer.     I recommend self monthly full body exams and yearly full body exams with a dermatology provider. If you develop a new or changing lesion please follow up for examination. Most skin cancers are pink and scaly or pink and pearly. However, we do see blue/brown/black skin cancers.  Consider the ABCDEs of melanoma when giving yourself your monthly full body exam ( don't forget the groin, buttocks, feet, toes, etc). A-asymmetry, B-borders, C-color, D-diameter, E-elevation or evolving. If you see any of these changes please follow up in clinic. If you cannot see your back I recommend purchasing a hand held mirror to use with a larger wall mirror.

## 2023-06-01 NOTE — NURSING NOTE
Leanna Griffin's chief complaint for this visit includes:  Chief Complaint   Patient presents with     Skin Check     Patient is here for skin check and is concerned about a dry spot on right cheek and left elbow. Hx of melanoma.     PCP: Char Gleason    Referring Provider:  No referring provider defined for this encounter.    [facility] 06/23/2006   No Pain (0)        Allergies   Allergen Reactions     Clavulanic Acid Hives         Do you need any medication refills at today's visit? No    Estee Barajas MA

## 2023-06-01 NOTE — PROGRESS NOTES
HPI:   Chief complaints: Laenna Griffin is a pleasant 67 year old female who presents for Full skin cancer screening to rule out skin cancer   Last Skin Exam: 1 year ago      1st Baseline: no  Personal HX of Skin Cancer: Yes melanoma of the chest   Personal HX of Malignant Melanoma: yes history of melanoma on the chest in 2018 depth 0.35 mm  Family HX of Skin Cancer / Malignant Melanoma: Yes father with a history of skin cancer  Personal HX of Atypical Moles:   no  Risk factors: history of sun exposure and burns  New / Changing lesions:no  Social History: goes to [place], [place] during the winter months. Here today with  Yasmany.   On review of systems, there are no further skin complaints, patient is feeling otherwise well.   ROS of the following were done and are negative: Constitutional, Eyes, Ears, Nose,   Mouth, Throat, Cardiovascular, Respiratory, GI, Genitourinary, Musculoskeletal,   Psychiatric, Endocrine, Allergic/Immunologic.    PHYSICAL EXAM:   LMP 06/23/2006   Skin exam performed as follows: Type 2 skin. Mood appropriate  Alert and Oriented X 3. Well developed, well nourished in no distress.  General appearance: Normal  Head including face: Normal  Eyes: conjunctiva and lids: Normal  Mouth: Lips, teeth, gums: Normal  Neck: Normal  Chest-breast/axillae: Normal  Back: Normal  Spleen and liver: Normal  Cardiovascular: Exam of peripheral vascular system by observation for swelling, varicosities, edema: Normal  Genitalia: groin, buttocks: Normal  Extremities: digits/nails (clubbing): Normal  Eccrine and Apocrine glands: Normal  Right upper extremity: Normal  Left upper extremity: Normal  Right lower extremity: Normal  Left lower extremity: Normal  Skin: Scalp and body hair: See below    Pt deferred exam of breasts, groin, buttocks: No    Other physical findings:  1. Multiple pigmented macules on extremities and trunk  2. Multiple pigmented macules on face, trunk and extremities  3. Multiple vascular papules  on trunk, arms and legs  4. Multiple scattered keratotic plaques  5. Pink gritty papule on the right cheek x 1  6. Inflamed keratotic papule on the left elbow x 1       Except as noted above, no other signs of skin cancer or melanoma.     ASSESSMENT/PLAN:   Benign Full skin cancer screening today. . Patient with history of melanoma   Advised on monthly self exams and 1 year  Patient Education: Appropriate brochures given.    1. Multiple benign appearing nevi on arms, legs and trunk. Discussed ABCDEs of melanoma and sunscreen.   2. Multiple lentigos on arms, legs and trunk. Advised benign, no treatment needed.  3. Multiple scattered angiomas. Advised benign, no treatment needed.   4. Seborrheic keratosis on arms, legs and trunk. Advised benign, no treatment needed.  5. Actinic keratosis on the right cheek x 1. As precancerous, cryosurgery performed. Advised on blistering and post-op care. Advised if not resolved in 1-2 months to return for evaluation  6. Inflamed seborrheic keratosis on the left elbow x 1. As physically tender cryosurgery performed. Advised on post op care.                 Follow-up: yearly FSE/PRN sooner    1.) Patient was asked about new and changing moles. YES  2.) Patient received a complete physical skin examination: YES  3.) Patient was counseled to perform a monthly self skin examination: YES  Scribed By: Leidy Lala MS, PA-C

## 2023-06-01 NOTE — LETTER
6/1/2023         RE: Leanna Griffin  [address] [facility] [id number]        Dear Colleague,    Thank you for referring your patient, Leanna Griffin, to the [facility]. Please see a copy of my visit note below.    HPI:   Chief complaints: Leanna Griffin is a pleasant 67 year old female who presents for Full skin cancer screening to rule out skin cancer   Last Skin Exam: 1 year ago      1st Baseline: no  Personal HX of Skin Cancer: Yes melanoma of the chest   Personal HX of Malignant Melanoma: yes history of melanoma on the chest in 2018 depth 0.35 mm  Family HX of Skin Cancer / Malignant Melanoma: Yes father with a history of skin cancer  Personal HX of Atypical Moles:   no  Risk factors: history of sun exposure and burns  New / Changing lesions:no  Social History: goes to [place], [place] during the winter months. Here today with  Yasmany.   On review of systems, there are no further skin complaints, patient is feeling otherwise well.   ROS of the following were done and are negative: Constitutional, Eyes, Ears, Nose,   Mouth, Throat, Cardiovascular, Respiratory, GI, Genitourinary, Musculoskeletal,   Psychiatric, Endocrine, Allergic/Immunologic.    PHYSICAL EXAM:   LMP 06/23/2006   Skin exam performed as follows: Type 2 skin. Mood appropriate  Alert and Oriented X 3. Well developed, well nourished in no distress.  General appearance: Normal  Head including face: Normal  Eyes: conjunctiva and lids: Normal  Mouth: Lips, teeth, gums: Normal  Neck: Normal  Chest-breast/axillae: Normal  Back: Normal  Spleen and liver: Normal  Cardiovascular: Exam of peripheral vascular system by observation for swelling, varicosities, edema: Normal  Genitalia: groin, buttocks: Normal  Extremities: digits/nails (clubbing): Normal  Eccrine and Apocrine glands: Normal  Right upper extremity: Normal  Left upper extremity: Normal  Right lower extremity: Normal  Left lower extremity: Normal  Skin: Scalp  and body hair: See below    Pt deferred exam of breasts, groin, buttocks: No    Other physical findings:  1. Multiple pigmented macules on extremities and trunk  2. Multiple pigmented macules on face, trunk and extremities  3. Multiple vascular papules on trunk, arms and legs  4. Multiple scattered keratotic plaques  5. Pink gritty papule on the right cheek x 1  6. Inflamed keratotic papule on the left elbow x 1       Except as noted above, no other signs of skin cancer or melanoma.     ASSESSMENT/PLAN:   Benign Full skin cancer screening today. . Patient with history of melanoma   Advised on monthly self exams and 1 year  Patient Education: Appropriate brochures given.    1. Multiple benign appearing nevi on arms, legs and trunk. Discussed ABCDEs of melanoma and sunscreen.   2. Multiple lentigos on arms, legs and trunk. Advised benign, no treatment needed.  3. Multiple scattered angiomas. Advised benign, no treatment needed.   4. Seborrheic keratosis on arms, legs and trunk. Advised benign, no treatment needed.  5. Actinic keratosis on the right cheek x 1. As precancerous, cryosurgery performed. Advised on blistering and post-op care. Advised if not resolved in 1-2 months to return for evaluation  6. Inflamed seborrheic keratosis on the left elbow x 1. As physically tender cryosurgery performed. Advised on post op care.                 Follow-up: yearly FSE/PRN sooner    1.) Patient was asked about new and changing moles. YES  2.) Patient received a complete physical skin examination: YES  3.) Patient was counseled to perform a monthly self skin examination: YES  Scribed By: Leidy Lala, MS, PAAdamC          Again, thank you for allowing me to participate in the care of your patient.        Sincerely,        Leidy Lala PA-C

## 2023-11-25 ENCOUNTER — HEALTH MAINTENANCE LETTER (OUTPATIENT)
Age: 68
End: 2023-11-25

## 2024-06-03 ENCOUNTER — OFFICE VISIT (OUTPATIENT)
Dept: DERMATOLOGY | Facility: CLINIC | Age: 69
End: 2024-06-03
Payer: MEDICARE

## 2024-06-03 DIAGNOSIS — L81.4 LENTIGO: ICD-10-CM

## 2024-06-03 DIAGNOSIS — Z85.820 HISTORY OF MELANOMA: ICD-10-CM

## 2024-06-03 DIAGNOSIS — D22.9 NEVUS: Primary | ICD-10-CM

## 2024-06-03 DIAGNOSIS — L82.1 SEBORRHEIC KERATOSIS: ICD-10-CM

## 2024-06-03 DIAGNOSIS — D18.01 ANGIOMA OF SKIN: ICD-10-CM

## 2024-06-03 PROCEDURE — 99213 OFFICE O/P EST LOW 20 MIN: CPT | Performed by: PHYSICIAN ASSISTANT

## 2024-06-03 RX ORDER — METOPROLOL TARTRATE 25 MG/1
TABLET, FILM COATED ORAL
COMMUNITY
Start: 2024-02-26

## 2024-06-03 ASSESSMENT — PAIN SCALES - GENERAL: PAINLEVEL: NO PAIN (0)

## 2024-06-03 NOTE — PROGRESS NOTES
HPI:   Chief complaints: Leanna Griffin is a pleasant 68 year old female who presents for Full skin cancer screening to rule out skin cancer   Last Skin Exam: 1 year ago      1st Baseline: no  Personal HX of Skin Cancer: Yes melanoma of the chest   Personal HX of Malignant Melanoma: yes history of melanoma on the chest in 2018 depth 0.35 mm  Family HX of Skin Cancer / Malignant Melanoma: Yes father with a history of skin cancer  Personal HX of Atypical Moles:   no  Risk factors: history of sun exposure and burns  New / Changing lesions:no  Social History: goes to [place], [place] during the winter months. Here today with  Yasmany.   On review of systems, there are no further skin complaints, patient is feeling otherwise well.   ROS of the following were done and are negative: Constitutional, Eyes, Ears, Nose,   Mouth, Throat, Cardiovascular, Respiratory, GI, Genitourinary, Musculoskeletal,   Psychiatric, Endocrine, Allergic/Immunologic.    PHYSICAL EXAM:   LMP 06/23/2006   Skin exam performed as follows: Type 2 skin. Mood appropriate  Alert and Oriented X 3. Well developed, well nourished in no distress.  General appearance: Normal  Head including face: Normal  Eyes: conjunctiva and lids: Normal  Mouth: Lips, teeth, gums: Normal  Neck: Normal  Chest-breast/axillae: Normal  Back: Normal  Spleen and liver: Normal  Cardiovascular: Exam of peripheral vascular system by observation for swelling, varicosities, edema: Normal  Genitalia: groin, buttocks: Normal  Extremities: digits/nails (clubbing): Normal  Eccrine and Apocrine glands: Normal  Right upper extremity: Normal  Left upper extremity: Normal  Right lower extremity: Normal  Left lower extremity: Normal  Skin: Scalp and body hair: See below    Pt deferred exam of breasts, groin, buttocks: No    Other physical findings:  1. Multiple pigmented macules on extremities and trunk  2. Multiple pigmented macules on face, trunk and extremities  3. Multiple vascular papules  on trunk, arms and legs  4. Multiple scattered keratotic plaques         Except as noted above, no other signs of skin cancer or melanoma.     ASSESSMENT/PLAN:   Benign Full skin cancer screening today. . Patient with history of melanoma   Advised on monthly self exams and 1 year  Patient Education: Appropriate brochures given.    Multiple benign appearing nevi on arms, legs and trunk. Discussed ABCDEs of melanoma and sunscreen.   Multiple lentigos on arms, legs and trunk. Advised benign, no treatment needed.  Multiple scattered angiomas. Advised benign, no treatment needed.   Seborrheic keratosis on arms, legs and trunk. Advised benign, no treatment needed.          Follow-up: yearly FSE/PRN sooner    1.) Patient was asked about new and changing moles. YES  2.) Patient received a complete physical skin examination: YES  3.) Patient was counseled to perform a monthly self skin examination: YES  Scribed By: Leidy Lala MS, PA-C

## 2024-06-03 NOTE — NURSING NOTE
Chief Complaint   Patient presents with    Skin Check     Spot on back        There were no vitals filed for this visit.  Wt Readings from Last 1 Encounters:   08/05/20 113.4 kg (250 lb)       Shira Guevara LPN .................6/3/2024

## 2024-06-03 NOTE — LETTER
6/3/2024         RE: Leanna Griffin  [address] [facility] [id number]        Dear Colleague,    Thank you for referring your patient, Leanna Griffin, to the [facility]. Please see a copy of my visit note below.    HPI:   Chief complaints: Leanna Griffin is a pleasant 68 year old female who presents for Full skin cancer screening to rule out skin cancer   Last Skin Exam: 1 year ago      1st Baseline: no  Personal HX of Skin Cancer: Yes melanoma of the chest   Personal HX of Malignant Melanoma: yes history of melanoma on the chest in 2018 depth 0.35 mm  Family HX of Skin Cancer / Malignant Melanoma: Yes father with a history of skin cancer  Personal HX of Atypical Moles:   no  Risk factors: history of sun exposure and burns  New / Changing lesions:no  Social History: goes to [place], [place] during the winter months. Here today with  Yasmany.   On review of systems, there are no further skin complaints, patient is feeling otherwise well.   ROS of the following were done and are negative: Constitutional, Eyes, Ears, Nose,   Mouth, Throat, Cardiovascular, Respiratory, GI, Genitourinary, Musculoskeletal,   Psychiatric, Endocrine, Allergic/Immunologic.    PHYSICAL EXAM:   LMP 06/23/2006   Skin exam performed as follows: Type 2 skin. Mood appropriate  Alert and Oriented X 3. Well developed, well nourished in no distress.  General appearance: Normal  Head including face: Normal  Eyes: conjunctiva and lids: Normal  Mouth: Lips, teeth, gums: Normal  Neck: Normal  Chest-breast/axillae: Normal  Back: Normal  Spleen and liver: Normal  Cardiovascular: Exam of peripheral vascular system by observation for swelling, varicosities, edema: Normal  Genitalia: groin, buttocks: Normal  Extremities: digits/nails (clubbing): Normal  Eccrine and Apocrine glands: Normal  Right upper extremity: Normal  Left upper extremity: Normal  Right lower extremity: Normal  Left lower extremity: Normal  Skin: Scalp  and body hair: See below    Pt deferred exam of breasts, groin, buttocks: No    Other physical findings:  1. Multiple pigmented macules on extremities and trunk  2. Multiple pigmented macules on face, trunk and extremities  3. Multiple vascular papules on trunk, arms and legs  4. Multiple scattered keratotic plaques         Except as noted above, no other signs of skin cancer or melanoma.     ASSESSMENT/PLAN:   Benign Full skin cancer screening today. . Patient with history of melanoma   Advised on monthly self exams and 1 year  Patient Education: Appropriate brochures given.    Multiple benign appearing nevi on arms, legs and trunk. Discussed ABCDEs of melanoma and sunscreen.   Multiple lentigos on arms, legs and trunk. Advised benign, no treatment needed.  Multiple scattered angiomas. Advised benign, no treatment needed.   Seborrheic keratosis on arms, legs and trunk. Advised benign, no treatment needed.          Follow-up: yearly FSE/PRN sooner    1.) Patient was asked about new and changing moles. YES  2.) Patient received a complete physical skin examination: YES  3.) Patient was counseled to perform a monthly self skin examination: YES  Scribed By: Leidy Lala, MS, PAAdamC        Again, thank you for allowing me to participate in the care of your patient.        Sincerely,        Leidy Lala PA-C

## 2024-06-26 ENCOUNTER — OFFICE VISIT (OUTPATIENT)
Dept: DERMATOLOGY | Facility: CLINIC | Age: 69
End: 2024-06-26
Payer: MEDICARE

## 2024-06-26 DIAGNOSIS — L72.0 EPIDERMAL CYST: Primary | ICD-10-CM

## 2024-06-26 PROCEDURE — 88304 TISSUE EXAM BY PATHOLOGIST: CPT | Performed by: DERMATOLOGY

## 2024-06-26 PROCEDURE — 11402 EXC TR-EXT B9+MARG 1.1-2 CM: CPT | Performed by: DERMATOLOGY

## 2024-06-26 PROCEDURE — 12031 INTMD RPR S/A/T/EXT 2.5 CM/<: CPT | Performed by: DERMATOLOGY

## 2024-06-26 ASSESSMENT — PAIN SCALES - GENERAL: PAINLEVEL: NO PAIN (0)

## 2024-06-26 NOTE — PROGRESS NOTES
Leanna Griffin is an extremely pleasant 68 year old year old female patient here today for evaluation and managment of cyst on back.  Patient has no other skin complaints today.  Remainder of the HPI, Meds, PMH, Allergies, FH, and SH was reviewed in chart.      Past Medical History:   Diagnosis Date    Abnormal maternal glucose tolerance, complicating pregnancy, childbirth, or the puerperium, unspecified as to episode of care 1974    Gestational diabetes    Gouty arthropathy 4/20/1996    Single exacerbation without recurrence    Hyperlipidemia LDL goal <130 10/31/2010    Neuropathy symptoms.  Stop the statins for 8 weeks and recheck.  If pain in feet/legs goes away, we need to find an alternative for treatment.  If no change in neuropathy, restart.      Hypertension goal BP (blood pressure) < 140/90 4/20/2006    Last exam: February 19, 2007 BPs: 162/92 - 140/86 Meds: intollerant of Hctz 25 started (February 19, 2007) Labs:Obtained February 19, 2007 BPs in April, July and here have been high, and BP at [facility] also high. Htn survey negative except for thyroid symptoms.  Also taking OCP and recommend stopping    HYPOTHYROIDISM NOS 11/23/2004    TSH    10.06   12/12/2014 .  Increase to 137. TSH     8.56   2/9/2015 - increase to 150.       Lyme disease 11/23/2004    Malignant melanoma (H)     Vitamin B12 deficiency (non anemic) 9/11/2014    Supplement with oral b12 and recheck in 12 weeks.         Past Surgical History:   Procedure Laterality Date    HC CLOSED TX METACARPAL FX W EXT FIXATION, W MANIP, EACH Left 2005    Left hand. Related to injury    HC REMOVAL OF TONSILS,<11 Y/O          Family History   Problem Relation Age of Onset    C.A.D. Father     Melanoma Father     Breast Cancer Mother     Diabetes Paternal Aunt     Cancer Other         ovarian in mother, prostate in father; melanoma in father    Lipids Other     Thyroid Disease Other         mother, father       Social History     Socioeconomic History     Marital status:      Spouse name: Yasmany    Number of children: 1    Years of education: 14    Highest education level: Not on file   Occupational History    Occupation: [employer]     Comment: Pay roll   Tobacco Use    Smoking status: Never    Smokeless tobacco: Never   Vaping Use    Vaping status: Never Used   Substance and Sexual Activity    Alcohol use: Yes     Alcohol/week: 0.0 standard drinks of alcohol     Comment: one drink/month    Drug use: No    Sexual activity: Yes     Partners: Male   Other Topics Concern    Parent/sibling w/ CABG, MI or angioplasty before 65F 55M? No   Social History Narrative    Not on file     Social Determinants of Health     Financial Resource Strain: Not on file   Food Insecurity: Not on file   Transportation Needs: Not on file   Physical Activity: Not on file   Stress: Not on file   Social Connections: Not on file   Interpersonal Safety: Not on file   Housing Stability: Not on file       Outpatient Encounter Medications as of 6/26/2024   Medication Sig Dispense Refill    alendronate (FOSAMAX) 70 MG tablet TAKE 1 TABLET (70 MG) BY MOUTH EVERY 7 (SEVEN) DAYS BEFORE BREAKFAST.      Beclomethasone Dipropionate (QNASL) 80 MCG/ACT AERS Nasal Spray Spray 2 sprays into both nostrils daily 8.7 g 3    calcium 500 MG CHEW Take 500 mg by mouth daily      cholecalciferol (VITAMIN D3) 5000 UNITS CAPS capsule Take 5,000 Units by mouth daily      folic acid (FOLVITE) 1 MG tablet Take 1 tablet (1,000 mcg) by mouth daily (Needs follow-up appointment for this medication) (Patient not taking: Reported on 8/26/2021) 30 tablet 0    folic acid (FOLVITE) 1 MG tablet TAKE 1 TABLET BY MOUTH DAILY (NEEDS FOLLOW-UP APPOINTMENT FOR THIS MEDICATION) 30 tablet 0    levothyroxine (SYNTHROID/LEVOTHROID) 175 MCG tablet TAKE 1 TABLET (175 MCG) BY MOUTH ONCE DAILY.      levothyroxine (SYNTHROID/LEVOTHROID) 200 MCG tablet TAKE 1 TABLET BY MOUTH DAILY (Patient not taking: Reported on 7/18/2022) 30  tablet 0    metoprolol tartrate (LOPRESSOR) 25 MG tablet TAKE 1/2 (HALF) A TABLET BY MOUTH TWICE A DAY WITH FOOD FOR 90 DAYS      montelukast (SINGULAIR) 10 MG tablet Take 1 tablet (10 mg) by mouth 2 times daily 180 tablet 3    Multiple Vitamins-Minerals (WOMENS MULTI VITAMIN & MINERAL PO) Take by mouth daily      simvastatin (ZOCOR) 40 MG tablet Take 1 tablet (40 mg) by mouth At Bedtime 90 tablet 2    vitamin B-12 (CYANOCOBALAMIN) 1000 MCG/ML injection INJECT 0.5ML INTO THE MUSCLE EVERY 30 DAYS ADMINISTER 2 WEEKS AFTER 1000 MCG DOSE 1 mL 0     Facility-Administered Encounter Medications as of 6/26/2024   Medication Dose Route Frequency Provider Last Rate Last Admin    sodium chloride (PF) 0.9% PF flush 3 mL  3 mL Intracatheter Q8H Danitza Childress MD   20 mL at 09/02/15 1651             O:   NAD, WDWN, Alert & Oriented, Mood & Affect wnl, Vitals stable   General appearance normal   Vitals stable   Alert, oriented and in no acute distress     R upper back 1.3cm nodule with comedone      Eyes: Conjunctivae/lids:Normal     ENT: Lips, mucosa: normal    MSK:Normal    Cardiovascular: peripheral edema none    Pulm: Breathing Normal    Neuro/Psych: Orientation:Alert and Orientedx3 ; Mood/Affect:normal       A/P:  R upper back cyst  EXCISION OF CYST AND INT: After thorough discussion of PGACAC, consent obtained, anesthesia and prep, the margins of the cyst were identified and an elliptical incision was made encompassing the cyst. The incisions were made through the skin and down to and including the subcutaneous tissue. The cyst was removed en bloc and submitted for permanent pathologic review. hemostasis was achieved. The wound edges were then closed in a layered fashion, being careful not to leave any dead space. Postoperative length was 1.3 cm.   EBL minimal; complications none; wound care routine. The patient was discharged in good condition and will return in one week for wound evaluation.  It was a pleasure speaking  to Leanna Griffin today.

## 2024-06-26 NOTE — PATIENT INSTRUCTIONS
"  Sutured Wound Care     [facility]: [phone]    [facility]: [phone]          No strenuous activity for 48 hours. Resume moderate activity in 48 hours. No heavy exercising until you are seen for follow up in one week.     Take Tylenol as needed for discomfort.                         Do not drink alcoholic beverages for 48 hours.     Keep the pressure bandage in place for 24 hours. If the bandage becomes blood tinged or loose, reinforce it with gauze and tape.        (Refer to the reverse side of this page for management of bleeding).    Remove pressure bandage in 24 hours     Leave the flat bandage in place until your follow up appointment. (Marked with \"X\")    Keep the bandage dry. Wash around it carefully.    If the tape becomes soiled or starts to come off, reinforce it with additional paper tape.    Do not smoke for 3 weeks; smoking is detrimental to wound healing.    It is normal to have swelling and bruising around the surgical site. The bruising will fade in approximately 10-14 days. Elevate the area to reduce swelling.    Numbness, itchiness and sensitivity to temperature changes can occur after surgery and may take up to 18 months to normalize.      POSSIBLE COMPLICATIONS    BLEEDING:    Leave the bandage in place.  Use tightly rolled up gauze or a cloth to apply direct pressure over the bandage for 20   minutes.  Reapply pressure for an additional 20 minutes if necessary  Call the office or go to the nearest emergency room if pressure fails to stop the bleeding.  Use additional gauze and tape to maintain pressure once the bleeding has stopped.        PAIN:    Post operative pain should slowly get better, never worse.  A severe increase in pain may indicate a problem. Call the office if this occurs.    In case of emergency phone:Dr Valdes [phone]     "

## 2024-06-26 NOTE — LETTER
6/26/2024      Leanna Griffin  [address] [address] [address]      Dear Colleague,    Thank you for referring your patient, Leanna Griffin, to the [facility]. Please see a copy of my visit note below.    Leanna Griffin is an extremely pleasant 68 year old year old female patient here today for evaluation and managment of cyst on back.  Patient has no other skin complaints today.  Remainder of the HPI, Meds, PMH, Allergies, FH, and SH was reviewed in chart.      Past Medical History:   Diagnosis Date     Abnormal maternal glucose tolerance, complicating pregnancy, childbirth, or the puerperium, unspecified as to episode of care 1974    Gestational diabetes     Gouty arthropathy 4/20/1996    Single exacerbation without recurrence     Hyperlipidemia LDL goal <130 10/31/2010    Neuropathy symptoms.  Stop the statins for 8 weeks and recheck.  If pain in feet/legs goes away, we need to find an alternative for treatment.  If no change in neuropathy, restart.       Hypertension goal BP (blood pressure) < 140/90 4/20/2006    Last exam: February 19, 2007 BPs: 162/92 - 140/86 Meds: intollerant of Hctz 25 started (February 19, 2007) Labs:Obtained February 19, 2007 BPs in April, July and here have been high, and BP at [facility] also high. Htn survey negative except for thyroid symptoms.  Also taking OCP and recommend stopping     HYPOTHYROIDISM NOS 11/23/2004    TSH    10.06   12/12/2014 .  Increase to 137. TSH     8.56   2/9/2015 - increase to 150.        Lyme disease 11/23/2004     Malignant melanoma (H)      Vitamin B12 deficiency (non anemic) 9/11/2014    Supplement with oral b12 and recheck in 12 weeks.         Past Surgical History:   Procedure Laterality Date     HC CLOSED TX METACARPAL FX W EXT FIXATION, W MANIP, EACH Left 2005    Left hand. Related to injury     HC REMOVAL OF TONSILS,<13 Y/O          Family History   Problem Relation Age of Onset     C.A.D. 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 No   Social History Narrative     Not on file     Social Determinants of Health     Financial Resource Strain: Not on file   Food Insecurity: Not on file   Transportation Needs: Not on file   Physical Activity: Not on file   Stress: Not on file   Social Connections: Not on file   Interpersonal Safety: Not on file   Housing Stability: Not on file       Outpatient Encounter Medications as of 6/26/2024   Medication Sig Dispense Refill     alendronate (FOSAMAX) 70 MG tablet TAKE 1 TABLET (70 MG) BY MOUTH EVERY 7 (SEVEN) DAYS BEFORE BREAKFAST.       Beclomethasone Dipropionate (QNASL) 80 MCG/ACT AERS Nasal Spray Spray 2 sprays into both nostrils daily 8.7 g 3     calcium 500 MG CHEW Take 500 mg by mouth daily       cholecalciferol (VITAMIN D3) 5000 UNITS CAPS capsule Take 5,000 Units by mouth daily       folic acid (FOLVITE) 1 MG tablet Take 1 tablet (1,000 mcg) by mouth daily (Needs follow-up appointment for this medication) (Patient not taking: Reported on 8/26/2021) 30 tablet 0     folic acid (FOLVITE) 1 MG tablet TAKE 1 TABLET BY MOUTH DAILY (NEEDS FOLLOW-UP  APPOINTMENT FOR THIS MEDICATION) 30 tablet 0     levothyroxine (SYNTHROID/LEVOTHROID) 175 MCG tablet TAKE 1 TABLET (175 MCG) BY MOUTH ONCE DAILY.       levothyroxine (SYNTHROID/LEVOTHROID) 200 MCG tablet TAKE 1 TABLET BY MOUTH DAILY (Patient not taking: Reported on 7/18/2022) 30 tablet 0     metoprolol tartrate (LOPRESSOR) 25 MG tablet TAKE 1/2 (HALF) A TABLET BY MOUTH TWICE A DAY WITH FOOD FOR 90 DAYS       montelukast (SINGULAIR) 10 MG tablet Take 1 tablet (10 mg) by mouth 2 times daily 180 tablet 3     Multiple Vitamins-Minerals (WOMENS MULTI VITAMIN & MINERAL PO) Take by mouth daily       simvastatin (ZOCOR) 40 MG tablet Take 1 tablet (40 mg) by mouth At Bedtime 90 tablet 2     vitamin B-12 (CYANOCOBALAMIN) 1000 MCG/ML injection INJECT 0.5ML INTO THE MUSCLE EVERY 30 DAYS ADMINISTER 2 WEEKS AFTER 1000 MCG DOSE 1 mL 0     Facility-Administered Encounter Medications as of 6/26/2024   Medication Dose Route Frequency Provider Last Rate Last Admin     sodium chloride (PF) 0.9% PF flush 3 mL  3 mL Intracatheter Q8H Danitza Childress MD   20 mL at 09/02/15 1651             O:   NAD, WDWN, Alert & Oriented, Mood & Affect wnl, Vitals stable   General appearance normal   Vitals stable   Alert, oriented and in no acute distress     R upper back 1.3cm nodule with comedone      Eyes: Conjunctivae/lids:Normal     ENT: Lips, mucosa: normal    MSK:Normal    Cardiovascular: peripheral edema none    Pulm: Breathing Normal    Neuro/Psych: Orientation:Alert and Orientedx3 ; Mood/Affect:normal       A/P:  R upper back cyst  EXCISION OF CYST AND INT: After thorough discussion of PGACAC, consent obtained, anesthesia and prep, the margins of the cyst were identified and an elliptical incision was made encompassing the cyst. The incisions were made through the skin and down to and including the subcutaneous tissue. The cyst was removed en bloc and submitted for permanent pathologic review. hemostasis was achieved. The wound edges were  then closed in a layered fashion, being careful not to leave any dead space. Postoperative length was 1.3 cm.   EBL minimal; complications none; wound care routine. The patient was discharged in good condition and will return in one week for wound evaluation.  It was a pleasure speaking to Leanna Griffin today.      Again, thank you for allowing me to participate in the care of your patient.        Sincerely,        Garfield Valdes MD

## 2024-06-27 LAB
PATH REPORT.COMMENTS IMP SPEC: NORMAL
PATH REPORT.COMMENTS IMP SPEC: NORMAL
PATH REPORT.FINAL DX SPEC: NORMAL
PATH REPORT.GROSS SPEC: NORMAL
PATH REPORT.MICROSCOPIC SPEC OTHER STN: NORMAL
PATH REPORT.RELEVANT HX SPEC: NORMAL

## 2024-07-03 ENCOUNTER — ALLIED HEALTH/NURSE VISIT (OUTPATIENT)
Dept: DERMATOLOGY | Facility: CLINIC | Age: 69
End: 2024-07-03
Payer: MEDICARE

## 2024-07-03 DIAGNOSIS — Z48.01 ENCOUNTER FOR CHANGE OR REMOVAL OF SURGICAL WOUND DRESSING: Primary | ICD-10-CM

## 2024-07-03 PROCEDURE — 99207 PR NO CHARGE NURSE ONLY: CPT

## 2024-07-03 NOTE — PROGRESS NOTES
Leanna Griffin comes into clinic today at the request of Dr. Valdes Ordering Provider for Wound Check Action taken: See Below.    This service provided today was under the supervising provider of the day Dr. Valdes, who was available if needed.    Pt returned to clinic for post surgery 1 week follow up bandage change. Pt has no complaints, denies pain. Bandage removed from mid back, area cleansed with normal saline. Site is healing and wound edges approximating well. Reapplied new steri strips and paper tape.    Advised to watch for signs/sx of infection; spreading redness, drainage, odor, fever. Call or report promptly to clinic. Pt given written instructions and informed to rtc as needed. Patient verbalized understanding.     Michelle Lanier MA

## 2024-07-03 NOTE — PATIENT INSTRUCTIONS
WOUND CARE INSTRUCTIONS  for  ONE WEEK AFTER SURGERY          Leave flat bandage on your skin for one week after today s bandage change.  In one week when you remove the bandage, you may resume your regular skin care routine, including washing with mild soap and water, applying moisturizer, make-up and sunscreen.    If there are any open or bleeding areas at the incision/graft site you should begin to cover the area with a bandage daily as follows:    Clean and dry the area with plain tap water using a Q-tip or sterile gauze pad.  Apply Polysporin or Bacitracin ointment to the open area.  Cover the wound with a band-aid or a sterile non-stick gauze pad and micropore paper tape.         SIGNS OF INFECTION  - If you notice any of these signs of infection, call your doctor right away: expanding redness around the wound.  - Yellow or greenish-colored pus or cloudy wound drainage.    - Red streaking spreading from the wound.  - Increased swelling, tenderness, or pain around the wound.   - Fever.    Please remember that yellow and clear drainage from a wound can be normal and related to normal wound healing.  Isolated drainage from a wound without a combination of the above features does not indicate infection.       *Once the bandages are removed, the scar will be red and firm (especially in the lip/chin area). This is normal and will fade in time. It might take 6-12 months for this to happen.     *Massaging the area will help the scar soften and fade quicker. Begin to massage the area one month after the bandages have been removed. To massage apply pressure directly and firmly over the scar with the fingertips and move in a circular motion. Massage the area for a few minutes several times a day. Continue to massage the site for several months.    *Approximately 6-8 weeks after surgery it is not uncommon to see the formation of  tender pimple-like  bump along the scar. This is normal. As the scar continues to mature and  the stitches underneath the skin begin to dissolve, this might occur. Do not pick or squeeze, this will resolve on it s own. Should one break open producing a small amount of drainage, apply Polysporin or Bacitracin ointment a few times a day until the wound is completely healed.    *Numbness in the surgical area is expected. It might take 12-18 months for the feeling to return to normal. During this time sensations of itchiness, tingling and occasional sharp pains might be noted. These feelings are normal and will subside once the nerves have completely healed.         IN CASE OF EMERGENCY: Dr Valdes [phone]     If you were seen in [place] call: [phone]    If you were seen in [place] call: [phone]

## 2024-11-09 ENCOUNTER — HEALTH MAINTENANCE LETTER (OUTPATIENT)
Age: 69
End: 2024-11-09

## 2025-06-02 ENCOUNTER — OFFICE VISIT (OUTPATIENT)
Dept: DERMATOLOGY | Facility: CLINIC | Age: 70
End: 2025-06-02
Payer: MEDICARE

## 2025-06-02 DIAGNOSIS — D18.01 CHERRY ANGIOMA: ICD-10-CM

## 2025-06-02 DIAGNOSIS — L82.1 SEBORRHEIC KERATOSES: ICD-10-CM

## 2025-06-02 DIAGNOSIS — D22.9 MULTIPLE BENIGN NEVI: Primary | ICD-10-CM

## 2025-06-02 DIAGNOSIS — Z12.83 SKIN CANCER SCREENING: ICD-10-CM

## 2025-06-02 DIAGNOSIS — Z85.820 PERSONAL HISTORY OF MALIGNANT MELANOMA: ICD-10-CM

## 2025-06-02 NOTE — LETTER
6/2/2025      Leanna Griffin  [address] [facility] [id number]      Dear Colleague,    Thank you for referring your patient, Leanna Griffin, to the [facility]. Please see a copy of my visit note below.    [facility] Dermatology Note  Encounter Date: Jun 2, 2025  Office Visit     Reviewed patients past medical history and pertinent chart review prior to patients visit today.     Dermatology Problem List:  Last skin check: 6/2/25    1.  Malignant melanoma, Breslow 0.34 mm, mid chest, status post Mohs 1/3/2018  2.  Dilated pore of Virginie, right upper back, excised 6/26/2024    Family Hx: Father, history of malignant melanoma and nonmelanoma skin cancer    ___________________________________________    CC: Skin Check    HPI:  Ms. Leanna Griffin is a(n) 69 year old female who presents today as a return patient for a full body skin cancer screening. The patient has a history of malignant melanoma, Breslow depth 0.34 mm involving the mid chest, status post Mohs 1/3/2018.  Today, she has a concern of a cyst on the left upper back.  This lesion is not bothersome to her.  However, the patient does have a history of an inflamed cyst on the back in the past that was eventually excised at her last appointment on 6/26/2024.  She reports being very diligent with photoprotection.    Medications:  Current Outpatient Medications   Medication Sig Dispense Refill     alendronate (FOSAMAX) 70 MG tablet TAKE 1 TABLET (70 MG) BY MOUTH EVERY 7 (SEVEN) DAYS BEFORE BREAKFAST.       Beclomethasone Dipropionate (QNASL) 80 MCG/ACT AERS Nasal Spray Spray 2 sprays into both nostrils daily 8.7 g 3     calcium 500 MG CHEW Take 500 mg by mouth daily       cholecalciferol (VITAMIN D3) 5000 UNITS CAPS capsule Take 5,000 Units by mouth daily       folic acid (FOLVITE) 1 MG tablet Take 1 tablet (1,000 mcg) by mouth daily (Needs follow-up appointment for this medication) (Patient not taking: Reported  on 8/26/2021) 30 tablet 0     folic acid (FOLVITE) 1 MG tablet TAKE 1 TABLET BY MOUTH DAILY (NEEDS FOLLOW-UP APPOINTMENT FOR THIS MEDICATION) 30 tablet 0     levothyroxine (SYNTHROID/LEVOTHROID) 175 MCG tablet TAKE 1 TABLET (175 MCG) BY MOUTH ONCE DAILY.       levothyroxine (SYNTHROID/LEVOTHROID) 200 MCG tablet TAKE 1 TABLET BY MOUTH DAILY (Patient not taking: Reported on 7/18/2022) 30 tablet 0     metoprolol tartrate (LOPRESSOR) 25 MG tablet TAKE 1/2 (HALF) A TABLET BY MOUTH TWICE A DAY WITH FOOD FOR 90 DAYS       montelukast (SINGULAIR) 10 MG tablet Take 1 tablet (10 mg) by mouth 2 times daily 180 tablet 3     Multiple Vitamins-Minerals (WOMENS MULTI VITAMIN & MINERAL PO) Take by mouth daily       simvastatin (ZOCOR) 40 MG tablet Take 1 tablet (40 mg) by mouth At Bedtime 90 tablet 2     vitamin B-12 (CYANOCOBALAMIN) 1000 MCG/ML injection INJECT 0.5ML INTO THE MUSCLE EVERY 30 DAYS ADMINISTER 2 WEEKS AFTER 1000 MCG DOSE 1 mL 0     No current facility-administered medications for this visit.     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  Stop the statins for 8 weeks and recheck.  If pain in feet/legs goes away, we need to find an alternative for treatment.  If no change in neuropathy, restart.       Hypertension goal BP (blood pressure) < 140/90 4/20/2006    Last exam: February 19, 2007 BPs: 162/92 - 140/86 Meds: intollerant of Hctz 25 started (February 19, 2007) Labs:Obtained February 19, 2007 BPs in April, July and here have been high, and BP at [facility] also high. Htn survey negative except for thyroid symptoms.  Also taking OCP and recommend stopping     HYPOTHYROIDISM NOS 11/23/2004    TSH    10.06   12/12/2014 .  Increase to 137. TSH     8.56   2/9/2015 - increase to 150.        Lyme disease 11/23/2004     Malignant melanoma (H)      Vitamin B12 deficiency (non anemic) 9/11/2014    Supplement with oral b12 and recheck in 12 weeks.         ___________________________________________     Physical Exam:  Vitals: LMP 06/23/2006   LYMPH: No cervical, axillary or supraclavicular lymphadenopathy.   SKIN: Total skin excluding the genitalia areas was performed. The exam included the scalp, face, neck, bilateral arms, chest, back, abdomen, bilateral legs, digits, mons pubis, buttocks, and nails.   - Rosales II.  - The mid chest demonstrates a well healed scar with no nodularity, repigmentation, or pain to palpation  - Multiple tan/brown macules and papules scattered throughout exam, consistent with benign nevi, many of which were examined under dermoscopy with no concerning features noted   - Scattered tan, homogenous macules on sun exposed skin, consistent with solar lentigines  - Scattered waxy, stuck on appearing papules and patches, consistent with seborrheic keratoses  - Several 1-2 mm red dome shaped symmetric  papules, consistent with cherry angiomas  -Left upper back, relatively flat, flesh-colored, slightly firm, mobile subcutaneous nodule with an overlying punctum most consistent with an epidermal cyst    - No other lesions of concern on areas examined    SYSTEMS REVIEW  The patient denies unintended weight loss, fevers, chills, night sweats, headache, cough, bloody sputum, shortness of breath, abdominal pain, nausea, numbness/weakness, swollen glands, bone pain, or changing pigmented skin lesions.    ____________________________________________    Assessment & Plan:   # Personal history of malignant melanoma, Breslow depth 0.34 mm, mid chest, status post Mohs 2018  # Nevi, trunk and extremities  # Solar lentigines  - No signs of recurrence. Continued observation recommended.   - Nevi demonstrate no concerning features on dermoscopy. We discussed the importance of self exams at home.   - ABCDEs: Counseled ABCDEs of melanoma: Asymmetry, Border (irregularity), Color (not uniform, changes in color), Diameter (greater than 6 mm which is about the size of a pencil eraser), and Evolving (any changes in preexisting moles).  - Sun protection: Counseled SPF 30+ sunscreen, UPF clothing, sun avoidance, tanning bed avoidance.    # Cherry angiomas  # Seborrheic keratoses  - We discussed the benign nature of the skin lesions. No treatment required. Continued observation recommended. Follow up with any concerns or changes.      # Epidermal cyst, left upper back  - Benign, no further treatment needed  - If lesion becomes bothersome, could consider excision in future  - Return for reassessment if changes or symptoms occur     Follow-up:  Annual for follow up full body skin exam, as needed for new or changing lesions or new concerns    All risks, benefits and alternatives were discussed with patient.  Patient is in agreement and understands the assessment and plan.  All questions were answered.    Vijaya Simpson PA-C  [facility]  [facility]    CC Leidy Lala PA-C  [address] [place] [address] on close of this encounter.    Again, thank you for allowing me to participate in the care of your patient.        Sincerely,        Vijaya Simpson PA-C    Electronically signed

## 2025-06-02 NOTE — PROGRESS NOTES
[facility] Dermatology Note  Encounter Date: Jun 2, 2025  Office Visit     Reviewed patients past medical history and pertinent chart review prior to patients visit today.     Dermatology Problem List:  Last skin check: 6/2/25    1.  Malignant melanoma, Breslow 0.34 mm, mid chest, status post Mohs 1/3/2018  2.  Dilated pore of Virginie, right upper back, excised 6/26/2024    Family Hx: Father, history of malignant melanoma and nonmelanoma skin cancer    ___________________________________________    CC: Skin Check    HPI:  Ms. Leanna Griffin is a(n) 69 year old female who presents today as a return patient for a full body skin cancer screening. The patient has a history of malignant melanoma, Breslow depth 0.34 mm involving the mid chest, status post Mohs 1/3/2018.  Today, she has a concern of a cyst on the left upper back.  This lesion is not bothersome to her.  However, the patient does have a history of an inflamed cyst on the back in the past that was eventually excised at her last appointment on 6/26/2024.  She reports being very diligent with photoprotection.    Medications:  Current Outpatient Medications   Medication Sig Dispense Refill    alendronate (FOSAMAX) 70 MG tablet TAKE 1 TABLET (70 MG) BY MOUTH EVERY 7 (SEVEN) DAYS BEFORE BREAKFAST.      Beclomethasone Dipropionate (QNASL) 80 MCG/ACT AERS Nasal Spray Spray 2 sprays into both nostrils daily 8.7 g 3    calcium 500 MG CHEW Take 500 mg by mouth daily      cholecalciferol (VITAMIN D3) 5000 UNITS CAPS capsule Take 5,000 Units by mouth daily      folic acid (FOLVITE) 1 MG tablet Take 1 tablet (1,000 mcg) by mouth daily (Needs follow-up appointment for this medication) (Patient not taking: Reported on 8/26/2021) 30 tablet 0    folic acid (FOLVITE) 1 MG tablet TAKE 1 TABLET BY MOUTH DAILY (NEEDS FOLLOW-UP APPOINTMENT FOR THIS MEDICATION) 30 tablet 0    levothyroxine (SYNTHROID/LEVOTHROID) 175 MCG tablet TAKE 1 TABLET (175 MCG) BY MOUTH ONCE  DAILY.      levothyroxine (SYNTHROID/LEVOTHROID) 200 MCG tablet TAKE 1 TABLET BY MOUTH DAILY (Patient not taking: Reported on 7/18/2022) 30 tablet 0    metoprolol tartrate (LOPRESSOR) 25 MG tablet TAKE 1/2 (HALF) A TABLET BY MOUTH TWICE A DAY WITH FOOD FOR 90 DAYS      montelukast (SINGULAIR) 10 MG tablet Take 1 tablet (10 mg) by mouth 2 times daily 180 tablet 3    Multiple Vitamins-Minerals (WOMENS MULTI VITAMIN & MINERAL PO) Take by mouth daily      simvastatin (ZOCOR) 40 MG tablet Take 1 tablet (40 mg) by mouth At Bedtime 90 tablet 2    vitamin B-12 (CYANOCOBALAMIN) 1000 MCG/ML injection INJECT 0.5ML INTO THE MUSCLE EVERY 30 DAYS ADMINISTER 2 WEEKS AFTER 1000 MCG DOSE 1 mL 0     No current facility-administered medications for this visit.     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  Stop the statins for 8 weeks and recheck.  If  pain in feet/legs goes away, we need to find an alternative for treatment.  If no change in neuropathy, restart.      Hypertension goal BP (blood pressure) < 140/90 4/20/2006    Last exam: February 19, 2007 BPs: 162/92 - 140/86 Meds: intollerant of Hctz 25 started (February 19, 2007) Labs:Obtained February 19, 2007 BPs in April, July and here have been high, and BP at [facility] also high. Htn survey negative except for thyroid symptoms.  Also taking OCP and recommend stopping    HYPOTHYROIDISM NOS 11/23/2004    TSH    10.06   12/12/2014 .  Increase to 137. TSH     8.56   2/9/2015 - increase to 150.       Lyme disease 11/23/2004    Malignant melanoma (H)     Vitamin B12 deficiency (non anemic) 9/11/2014    Supplement with oral b12 and recheck in 12 weeks.         ___________________________________________     Physical Exam:  Vitals: LMP 06/23/2006   LYMPH: No cervical, axillary or supraclavicular lymphadenopathy.   SKIN: Total skin excluding the genitalia areas was performed. The exam included the scalp, face, neck, bilateral arms, chest, back, abdomen, bilateral legs, digits, mons pubis, buttocks, and nails.   - Rosales II.  - The mid chest demonstrates a well healed scar with no nodularity, repigmentation, or pain to palpation  - Multiple tan/brown macules and papules scattered throughout exam, consistent with benign nevi, many of which were examined under dermoscopy with no concerning features noted   - Scattered tan, homogenous macules on sun exposed skin, consistent with solar lentigines  - Scattered waxy, stuck on appearing papules and patches, consistent with seborrheic keratoses  - Several 1-2 mm red dome shaped symmetric papules, consistent with cherry angiomas  -Left upper back, relatively flat, flesh-colored, slightly firm, mobile subcutaneous nodule with an overlying punctum most consistent with an epidermal cyst    - No other lesions of concern on areas examined    SYSTEMS REVIEW  The patient denies  unintended weight loss, fevers, chills, night sweats, headache, cough, bloody sputum, shortness of breath, abdominal pain, nausea, numbness/weakness, swollen glands, bone pain, or changing pigmented skin lesions.    ____________________________________________    Assessment & Plan:   # Personal history of malignant melanoma, Breslow depth 0.34 mm, mid chest, status post Mohs 2018  # Nevi, trunk and extremities  # Solar lentigines  - No signs of recurrence. Continued observation recommended.   - Nevi demonstrate no concerning features on dermoscopy. We discussed the importance of self exams at home.   - ABCDEs: Counseled ABCDEs of melanoma: Asymmetry, Border (irregularity), Color (not uniform, changes in color), Diameter (greater than 6 mm which is about the size of a pencil eraser), and Evolving (any changes in preexisting moles).  - Sun protection: Counseled SPF 30+ sunscreen, UPF clothing, sun avoidance, tanning bed avoidance.    # Cherry angiomas  # Seborrheic keratoses  - We discussed the benign nature of the skin lesions. No treatment required. Continued observation recommended. Follow up with any concerns or changes.      # Epidermal cyst, left upper back  - Benign, no further treatment needed  - If lesion becomes bothersome, could consider excision in future  - Return for reassessment if changes or symptoms occur     Follow-up:  Annual for follow up full body skin exam, as needed for new or changing lesions or new concerns    All risks, benefits and alternatives were discussed with patient.  Patient is in agreement and understands the assessment and plan.  All questions were answered.    Vijaya Simpson PA-C  [facility]    CC Leidy Lala PA-C  [address] [place] [address] on close of this encounter.